# Patient Record
Sex: FEMALE | Race: WHITE | NOT HISPANIC OR LATINO
[De-identification: names, ages, dates, MRNs, and addresses within clinical notes are randomized per-mention and may not be internally consistent; named-entity substitution may affect disease eponyms.]

---

## 2017-05-19 PROBLEM — Z00.00 ENCOUNTER FOR PREVENTIVE HEALTH EXAMINATION: Status: ACTIVE | Noted: 2017-05-19

## 2017-05-31 ENCOUNTER — APPOINTMENT (OUTPATIENT)
Dept: NEUROLOGY | Facility: CLINIC | Age: 37
End: 2017-05-31

## 2017-11-22 ENCOUNTER — EMERGENCY (EMERGENCY)
Facility: HOSPITAL | Age: 37
LOS: 1 days | Discharge: HOME | End: 2017-11-22
Attending: EMERGENCY MEDICINE | Admitting: EMERGENCY MEDICINE
Payer: COMMERCIAL

## 2017-11-22 VITALS
SYSTOLIC BLOOD PRESSURE: 139 MMHG | TEMPERATURE: 98 F | HEART RATE: 90 BPM | DIASTOLIC BLOOD PRESSURE: 93 MMHG | OXYGEN SATURATION: 98 %

## 2017-11-22 LAB
ALBUMIN SERPL ELPH-MCNC: 4.4 G/DL — SIGNIFICANT CHANGE UP (ref 3.3–5)
ALP SERPL-CCNC: 64 U/L — SIGNIFICANT CHANGE UP (ref 40–120)
ALT FLD-CCNC: 15 U/L — SIGNIFICANT CHANGE UP (ref 4–33)
APPEARANCE UR: SIGNIFICANT CHANGE UP
AST SERPL-CCNC: 20 U/L — SIGNIFICANT CHANGE UP (ref 4–32)
BASOPHILS # BLD AUTO: 0.05 K/UL — SIGNIFICANT CHANGE UP (ref 0–0.2)
BASOPHILS NFR BLD AUTO: 0.5 % — SIGNIFICANT CHANGE UP (ref 0–2)
BILIRUB SERPL-MCNC: 0.5 MG/DL — SIGNIFICANT CHANGE UP (ref 0.2–1.2)
BILIRUB UR-MCNC: NEGATIVE — SIGNIFICANT CHANGE UP
BLOOD UR QL VISUAL: HIGH
BUN SERPL-MCNC: 10 MG/DL — SIGNIFICANT CHANGE UP (ref 7–23)
CALCIUM SERPL-MCNC: 9.2 MG/DL — SIGNIFICANT CHANGE UP (ref 8.4–10.5)
CHLORIDE SERPL-SCNC: 103 MMOL/L — SIGNIFICANT CHANGE UP (ref 98–107)
CO2 SERPL-SCNC: 24 MMOL/L — SIGNIFICANT CHANGE UP (ref 22–31)
COLOR SPEC: YELLOW — SIGNIFICANT CHANGE UP
CREAT SERPL-MCNC: 0.83 MG/DL — SIGNIFICANT CHANGE UP (ref 0.5–1.3)
EOSINOPHIL # BLD AUTO: 0.07 K/UL — SIGNIFICANT CHANGE UP (ref 0–0.5)
EOSINOPHIL NFR BLD AUTO: 0.7 % — SIGNIFICANT CHANGE UP (ref 0–6)
GLUCOSE SERPL-MCNC: 116 MG/DL — HIGH (ref 70–99)
GLUCOSE UR-MCNC: NEGATIVE — SIGNIFICANT CHANGE UP
HCT VFR BLD CALC: 42.8 % — SIGNIFICANT CHANGE UP (ref 34.5–45)
HGB BLD-MCNC: 14.6 G/DL — SIGNIFICANT CHANGE UP (ref 11.5–15.5)
IMM GRANULOCYTES # BLD AUTO: 0.03 # — SIGNIFICANT CHANGE UP
IMM GRANULOCYTES NFR BLD AUTO: 0.3 % — SIGNIFICANT CHANGE UP (ref 0–1.5)
KETONES UR-MCNC: NEGATIVE — SIGNIFICANT CHANGE UP
LEUKOCYTE ESTERASE UR-ACNC: NEGATIVE — SIGNIFICANT CHANGE UP
LIDOCAIN IGE QN: 55.1 U/L — SIGNIFICANT CHANGE UP (ref 7–60)
LYMPHOCYTES # BLD AUTO: 1.7 K/UL — SIGNIFICANT CHANGE UP (ref 1–3.3)
LYMPHOCYTES # BLD AUTO: 16.7 % — SIGNIFICANT CHANGE UP (ref 13–44)
MCHC RBC-ENTMCNC: 30.5 PG — SIGNIFICANT CHANGE UP (ref 27–34)
MCHC RBC-ENTMCNC: 34.1 % — SIGNIFICANT CHANGE UP (ref 32–36)
MCV RBC AUTO: 89.5 FL — SIGNIFICANT CHANGE UP (ref 80–100)
MONOCYTES # BLD AUTO: 0.66 K/UL — SIGNIFICANT CHANGE UP (ref 0–0.9)
MONOCYTES NFR BLD AUTO: 6.5 % — SIGNIFICANT CHANGE UP (ref 2–14)
MUCOUS THREADS # UR AUTO: SIGNIFICANT CHANGE UP
NEUTROPHILS # BLD AUTO: 7.65 K/UL — HIGH (ref 1.8–7.4)
NEUTROPHILS NFR BLD AUTO: 75.3 % — SIGNIFICANT CHANGE UP (ref 43–77)
NITRITE UR-MCNC: NEGATIVE — SIGNIFICANT CHANGE UP
NRBC # FLD: 0 — SIGNIFICANT CHANGE UP
PH UR: 7.5 — SIGNIFICANT CHANGE UP (ref 4.6–8)
PLATELET # BLD AUTO: 276 K/UL — SIGNIFICANT CHANGE UP (ref 150–400)
PMV BLD: 9.3 FL — SIGNIFICANT CHANGE UP (ref 7–13)
POTASSIUM SERPL-MCNC: 3.7 MMOL/L — SIGNIFICANT CHANGE UP (ref 3.5–5.3)
POTASSIUM SERPL-SCNC: 3.7 MMOL/L — SIGNIFICANT CHANGE UP (ref 3.5–5.3)
PROT SERPL-MCNC: 7.2 G/DL — SIGNIFICANT CHANGE UP (ref 6–8.3)
PROT UR-MCNC: 30 — HIGH
RBC # BLD: 4.78 M/UL — SIGNIFICANT CHANGE UP (ref 3.8–5.2)
RBC # FLD: 12.2 % — SIGNIFICANT CHANGE UP (ref 10.3–14.5)
SODIUM SERPL-SCNC: 142 MMOL/L — SIGNIFICANT CHANGE UP (ref 135–145)
SP GR SPEC: 1.02 — SIGNIFICANT CHANGE UP (ref 1–1.03)
SQUAMOUS # UR AUTO: SIGNIFICANT CHANGE UP
UROBILINOGEN FLD QL: NORMAL E.U. — SIGNIFICANT CHANGE UP (ref 0.1–0.2)
WBC # BLD: 10.16 K/UL — SIGNIFICANT CHANGE UP (ref 3.8–10.5)
WBC # FLD AUTO: 10.16 K/UL — SIGNIFICANT CHANGE UP (ref 3.8–10.5)
WBC UR QL: SIGNIFICANT CHANGE UP (ref 0–?)

## 2017-11-22 PROCEDURE — 99284 EMERGENCY DEPT VISIT MOD MDM: CPT

## 2017-11-22 RX ORDER — SODIUM CHLORIDE 9 MG/ML
1000 INJECTION INTRAMUSCULAR; INTRAVENOUS; SUBCUTANEOUS ONCE
Qty: 0 | Refills: 0 | Status: COMPLETED | OUTPATIENT
Start: 2017-11-22 | End: 2017-11-22

## 2017-11-22 RX ORDER — FAMOTIDINE 10 MG/ML
20 INJECTION INTRAVENOUS ONCE
Qty: 0 | Refills: 0 | Status: COMPLETED | OUTPATIENT
Start: 2017-11-22 | End: 2017-11-22

## 2017-11-22 RX ORDER — METOCLOPRAMIDE HCL 10 MG
10 TABLET ORAL ONCE
Qty: 0 | Refills: 0 | Status: COMPLETED | OUTPATIENT
Start: 2017-11-22 | End: 2017-11-22

## 2017-11-22 RX ADMIN — SODIUM CHLORIDE 3000 MILLILITER(S): 9 INJECTION INTRAMUSCULAR; INTRAVENOUS; SUBCUTANEOUS at 22:09

## 2017-11-22 RX ADMIN — Medication 10 MILLIGRAM(S): at 22:09

## 2017-11-22 RX ADMIN — FAMOTIDINE 20 MILLIGRAM(S): 10 INJECTION INTRAVENOUS at 22:09

## 2017-11-22 NOTE — ED PROVIDER NOTE - OBJECTIVE STATEMENT
38 y/o F with a PMHx of HTN, presents to the ED for vomiting s/p use of muscle relaxant and Vicodin last night. Pt is being treated for chronic neck pain and was prescribed a muscle relaxant and Vicodin yesterday which pt took at 7:00pm. Pt is scheduled for MRI. Pt has been vomiting since waking up this morning, unable to tolerate PO intake. Pt also c/o HA. Denies abd pain, or any other complaints.

## 2017-11-22 NOTE — ED ADULT TRIAGE NOTE - CHIEF COMPLAINT QUOTE
Pt received cortisone shot and muscle relaxer yesterday for neck pain and has been throwing up since 7pm yesterday. Denies abd pain.

## 2017-11-22 NOTE — ED PROVIDER NOTE - MEDICAL DECISION MAKING DETAILS
38 y/o F presents for vomiting. Probable dehydration. Will give IV fluids and symptomatic treatment. Reassess.
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
## 2017-11-22 NOTE — ED ADULT NURSE NOTE - OBJECTIVE STATEMENT
Received pt in room intake 4, pt A&Ox4, respirations even and unlabored b/l. Abdomen soft, nondistended, nontender. IVL 20g Angiocath placed on left AC. Labs sent. Urine sent. Medicated as per provider order. Will continue to monitor.

## 2017-11-22 NOTE — ED PROVIDER NOTE - PROGRESS NOTE DETAILS
Patient reevaluated and feeling better, denies any pain at the moment, states that she feels hungry and wants to eat at home. Labs within normal, no dehydration, will DC home.

## 2018-02-02 ENCOUNTER — TRANSCRIPTION ENCOUNTER (OUTPATIENT)
Age: 38
End: 2018-02-02

## 2018-02-02 ENCOUNTER — APPOINTMENT (OUTPATIENT)
Dept: SURGERY | Facility: CLINIC | Age: 38
End: 2018-02-02
Payer: COMMERCIAL

## 2018-02-02 VITALS — DIASTOLIC BLOOD PRESSURE: 72 MMHG | HEART RATE: 96 BPM | HEIGHT: 65 IN | SYSTOLIC BLOOD PRESSURE: 111 MMHG

## 2018-02-02 DIAGNOSIS — F41.9 ANXIETY DISORDER, UNSPECIFIED: ICD-10-CM

## 2018-02-02 PROCEDURE — 99244 OFF/OP CNSLTJ NEW/EST MOD 40: CPT

## 2018-02-05 ENCOUNTER — RESULT REVIEW (OUTPATIENT)
Age: 38
End: 2018-02-05

## 2018-02-05 ENCOUNTER — FORM ENCOUNTER (OUTPATIENT)
Age: 38
End: 2018-02-05

## 2018-02-05 PROBLEM — F41.9 ANXIETY: Status: RESOLVED | Noted: 2018-02-05 | Resolved: 2018-02-05

## 2018-02-06 ENCOUNTER — APPOINTMENT (OUTPATIENT)
Dept: MAMMOGRAPHY | Facility: IMAGING CENTER | Age: 38
End: 2018-02-06
Payer: COMMERCIAL

## 2018-02-06 ENCOUNTER — OUTPATIENT (OUTPATIENT)
Dept: OUTPATIENT SERVICES | Facility: HOSPITAL | Age: 38
LOS: 1 days | End: 2018-02-06
Payer: COMMERCIAL

## 2018-02-06 DIAGNOSIS — Z00.8 ENCOUNTER FOR OTHER GENERAL EXAMINATION: ICD-10-CM

## 2018-02-06 PROCEDURE — C1739: CPT

## 2018-02-06 PROCEDURE — 19281 PERQ DEVICE BREAST 1ST IMAG: CPT | Mod: LT

## 2018-02-06 PROCEDURE — 19281 PERQ DEVICE BREAST 1ST IMAG: CPT

## 2018-02-08 ENCOUNTER — FORM ENCOUNTER (OUTPATIENT)
Age: 38
End: 2018-02-08

## 2018-02-08 ENCOUNTER — OUTPATIENT (OUTPATIENT)
Dept: OUTPATIENT SERVICES | Facility: HOSPITAL | Age: 38
LOS: 1 days | End: 2018-02-08
Payer: COMMERCIAL

## 2018-02-08 VITALS
SYSTOLIC BLOOD PRESSURE: 106 MMHG | WEIGHT: 132.94 LBS | HEART RATE: 89 BPM | RESPIRATION RATE: 20 BRPM | DIASTOLIC BLOOD PRESSURE: 70 MMHG | HEIGHT: 65 IN | OXYGEN SATURATION: 97 % | TEMPERATURE: 97 F

## 2018-02-08 DIAGNOSIS — I10 ESSENTIAL (PRIMARY) HYPERTENSION: ICD-10-CM

## 2018-02-08 DIAGNOSIS — D36.9 BENIGN NEOPLASM, UNSPECIFIED SITE: ICD-10-CM

## 2018-02-08 DIAGNOSIS — G43.909 MIGRAINE, UNSPECIFIED, NOT INTRACTABLE, WITHOUT STATUS MIGRAINOSUS: ICD-10-CM

## 2018-02-08 DIAGNOSIS — T78.40XA ALLERGY, UNSPECIFIED, INITIAL ENCOUNTER: ICD-10-CM

## 2018-02-08 DIAGNOSIS — D24.2 BENIGN NEOPLASM OF LEFT BREAST: ICD-10-CM

## 2018-02-08 LAB
ALBUMIN SERPL ELPH-MCNC: 4.4 G/DL — SIGNIFICANT CHANGE UP (ref 3.3–5)
ALP SERPL-CCNC: 63 U/L — SIGNIFICANT CHANGE UP (ref 40–120)
ALT FLD-CCNC: 15 U/L — SIGNIFICANT CHANGE UP (ref 4–33)
AST SERPL-CCNC: 16 U/L — SIGNIFICANT CHANGE UP (ref 4–32)
BILIRUB SERPL-MCNC: 0.4 MG/DL — SIGNIFICANT CHANGE UP (ref 0.2–1.2)
BUN SERPL-MCNC: 8 MG/DL — SIGNIFICANT CHANGE UP (ref 7–23)
CALCIUM SERPL-MCNC: 9.3 MG/DL — SIGNIFICANT CHANGE UP (ref 8.4–10.5)
CHLORIDE SERPL-SCNC: 103 MMOL/L — SIGNIFICANT CHANGE UP (ref 98–107)
CO2 SERPL-SCNC: 26 MMOL/L — SIGNIFICANT CHANGE UP (ref 22–31)
CREAT SERPL-MCNC: 0.73 MG/DL — SIGNIFICANT CHANGE UP (ref 0.5–1.3)
GLUCOSE SERPL-MCNC: 71 MG/DL — SIGNIFICANT CHANGE UP (ref 70–99)
HCT VFR BLD CALC: 42 % — SIGNIFICANT CHANGE UP (ref 34.5–45)
HGB BLD-MCNC: 13.8 G/DL — SIGNIFICANT CHANGE UP (ref 11.5–15.5)
MCHC RBC-ENTMCNC: 29.4 PG — SIGNIFICANT CHANGE UP (ref 27–34)
MCHC RBC-ENTMCNC: 32.9 % — SIGNIFICANT CHANGE UP (ref 32–36)
MCV RBC AUTO: 89.4 FL — SIGNIFICANT CHANGE UP (ref 80–100)
NRBC # FLD: 0 — SIGNIFICANT CHANGE UP
PLATELET # BLD AUTO: 291 K/UL — SIGNIFICANT CHANGE UP (ref 150–400)
PMV BLD: 9.6 FL — SIGNIFICANT CHANGE UP (ref 7–13)
POTASSIUM SERPL-MCNC: 3.9 MMOL/L — SIGNIFICANT CHANGE UP (ref 3.5–5.3)
POTASSIUM SERPL-SCNC: 3.9 MMOL/L — SIGNIFICANT CHANGE UP (ref 3.5–5.3)
PROT SERPL-MCNC: 7.1 G/DL — SIGNIFICANT CHANGE UP (ref 6–8.3)
RBC # BLD: 4.7 M/UL — SIGNIFICANT CHANGE UP (ref 3.8–5.2)
RBC # FLD: 12.1 % — SIGNIFICANT CHANGE UP (ref 10.3–14.5)
SODIUM SERPL-SCNC: 141 MMOL/L — SIGNIFICANT CHANGE UP (ref 135–145)
WBC # BLD: 9.92 K/UL — SIGNIFICANT CHANGE UP (ref 3.8–10.5)
WBC # FLD AUTO: 9.92 K/UL — SIGNIFICANT CHANGE UP (ref 3.8–10.5)

## 2018-02-08 PROCEDURE — 93010 ELECTROCARDIOGRAM REPORT: CPT

## 2018-02-08 RX ORDER — PROPRANOLOL HCL 160 MG
1 CAPSULE, EXTENDED RELEASE 24HR ORAL
Qty: 0 | Refills: 0 | COMMUNITY

## 2018-02-08 NOTE — H&P PST ADULT - FAMILY HISTORY
Mother  Still living? No  Family history of acute myocardial infarction, Age at diagnosis: Age Unknown     Father  Still living? No  Family history of cancer, Age at diagnosis: Age Unknown

## 2018-02-08 NOTE — H&P PST ADULT - NS PRO LAST MENSTRUAL DATE
2/02/18/unknown 2/02/18; pt denies chance of pregnancy/unknown 2/02/18 ; surgeon denies chance of pregnancy

## 2018-02-08 NOTE — H&P PST ADULT - HISTORY OF PRESENT ILLNESS
Pt is a 37 y.o. female ; pt s/p mammogram 1/17 ; pt reports dense breasts; pt reports " nodule left breast" pt to surgeon ; pt now presents for Excision Left Breast Mass with Destiny      Pt denies breast pain , denies nipple discharge

## 2018-02-08 NOTE — H&P PST ADULT - PROBLEM SELECTOR PLAN 1
Excision Left Breast Mass with Destiny     Pre op instructions given to pt pt appears to have a good understanding of pre op instructions Excision Left Breast Mass with Destiny     Pre op instructions given to pt pt appears to have a good understanding of pre op instructions  Cup provided for UCG dos

## 2018-02-08 NOTE — H&P PST ADULT - NSANTHOSAYNRD_GEN_A_CORE
No. NASEEM screening performed.  STOP BANG Legend: 0-2 = LOW Risk; 3-4 = INTERMEDIATE Risk; 5-8 = HIGH Risk

## 2018-02-08 NOTE — H&P PST ADULT - BREASTS COMMENTS
unable to palpate breast mass ; surgeon with original studies ;left breast s/p insertion cal ; @ 3 o'clock proximal to nipple [ highlighted by marker] since insertion  2/06/18 surgeon with original studies ;left breast s/p insertion cal ; @ 3 o'clock proximal to nipple [ highlighted by marker] since insertion  2/06/18

## 2018-02-08 NOTE — H&P PST ADULT - LYMPHATIC
supraclavicular L/posterior cervical L/posterior cervical R/supraclavicular R/anterior cervical R/anterior cervical L
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
## 2018-02-09 ENCOUNTER — APPOINTMENT (OUTPATIENT)
Dept: SURGERY | Facility: HOSPITAL | Age: 38
End: 2018-02-09

## 2018-02-09 ENCOUNTER — RESULT REVIEW (OUTPATIENT)
Age: 38
End: 2018-02-09

## 2018-02-09 ENCOUNTER — OUTPATIENT (OUTPATIENT)
Dept: OUTPATIENT SERVICES | Facility: HOSPITAL | Age: 38
LOS: 1 days | Discharge: HOME | End: 2018-02-09
Payer: COMMERCIAL

## 2018-02-09 VITALS
WEIGHT: 132.94 LBS | HEIGHT: 65 IN | TEMPERATURE: 98 F | HEART RATE: 88 BPM | SYSTOLIC BLOOD PRESSURE: 109 MMHG | RESPIRATION RATE: 14 BRPM | DIASTOLIC BLOOD PRESSURE: 68 MMHG

## 2018-02-09 VITALS
SYSTOLIC BLOOD PRESSURE: 111 MMHG | TEMPERATURE: 98 F | OXYGEN SATURATION: 95 % | DIASTOLIC BLOOD PRESSURE: 62 MMHG | RESPIRATION RATE: 12 BRPM | HEART RATE: 75 BPM

## 2018-02-09 DIAGNOSIS — D24.2 BENIGN NEOPLASM OF LEFT BREAST: ICD-10-CM

## 2018-02-09 PROCEDURE — 19125 EXCISION BREAST LESION: CPT

## 2018-02-09 PROCEDURE — 76098 X-RAY EXAM SURGICAL SPECIMEN: CPT | Mod: 26,GC

## 2018-02-09 PROCEDURE — 88305 TISSUE EXAM BY PATHOLOGIST: CPT | Mod: 26

## 2018-02-09 RX ORDER — SODIUM CHLORIDE 9 MG/ML
1000 INJECTION, SOLUTION INTRAVENOUS
Qty: 0 | Refills: 0 | Status: DISCONTINUED | OUTPATIENT
Start: 2018-02-09 | End: 2018-02-09

## 2018-02-09 RX ORDER — OXYCODONE HYDROCHLORIDE 5 MG/1
5 TABLET ORAL EVERY 4 HOURS
Qty: 0 | Refills: 0 | Status: DISCONTINUED | OUTPATIENT
Start: 2018-02-09 | End: 2018-02-09

## 2018-02-09 RX ORDER — ONDANSETRON 8 MG/1
4 TABLET, FILM COATED ORAL ONCE
Qty: 0 | Refills: 0 | Status: DISCONTINUED | OUTPATIENT
Start: 2018-02-09 | End: 2018-02-24

## 2018-02-09 RX ORDER — FENTANYL CITRATE 50 UG/ML
50 INJECTION INTRAVENOUS
Qty: 0 | Refills: 0 | Status: DISCONTINUED | OUTPATIENT
Start: 2018-02-09 | End: 2018-02-09

## 2018-02-09 RX ORDER — FENTANYL CITRATE 50 UG/ML
25 INJECTION INTRAVENOUS
Qty: 0 | Refills: 0 | Status: DISCONTINUED | OUTPATIENT
Start: 2018-02-09 | End: 2018-02-09

## 2018-02-09 RX ORDER — SODIUM CHLORIDE 9 MG/ML
1000 INJECTION, SOLUTION INTRAVENOUS
Qty: 0 | Refills: 0 | Status: DISCONTINUED | OUTPATIENT
Start: 2018-02-09 | End: 2018-02-24

## 2018-02-09 RX ORDER — ACETAMINOPHEN 500 MG
650 TABLET ORAL EVERY 6 HOURS
Qty: 0 | Refills: 0 | Status: DISCONTINUED | OUTPATIENT
Start: 2018-02-09 | End: 2018-02-24

## 2018-02-09 RX ORDER — ACETAMINOPHEN 500 MG
2 TABLET ORAL
Qty: 0 | Refills: 0 | COMMUNITY
Start: 2018-02-09

## 2018-02-09 NOTE — ASU DISCHARGE PLAN (ADULT/PEDIATRIC). - MEDICATION SUMMARY - MEDICATIONS TO TAKE
I will START or STAY ON the medications listed below when I get home from the hospital:    acetaminophen 325 mg oral tablet  -- 2 tab(s) by mouth every 6 hours, As needed, Moderate Pain (4 - 6)  -- Indication: For pain    Inderal 40 mg oral tablet  -- 1 tab(s) by mouth prn  -- Indication: For home    ALPRAZolam 2 mg oral tablet  -- 1 tab(s) by mouth once a day, As Needed for anxiety  -- Indication: For home

## 2018-02-09 NOTE — ASU DISCHARGE PLAN (ADULT/PEDIATRIC). - MEDICATION SUMMARY - MEDICATIONS TO STOP TAKING
I will STOP taking the medications listed below when I get home from the hospital:    Excedrin oral tablet  -- 2 tab(s) by mouth every 6 hours, As Needed for headache

## 2018-02-09 NOTE — ASU DISCHARGE PLAN (ADULT/PEDIATRIC). - INSTRUCTIONS
2/26/18 Start with clear liquids and gradually increase your diet as you can, until you return to your normal diet. Do not eat too much too fast for today. Start with small meals, and what you can tolerate. Avoid eating anything greasy or spicy for today to avoid nausea. You may not have an appetite today, and that is normal due to anesthesia but drink plenty of fluids throughout the day.

## 2018-02-09 NOTE — ASU DISCHARGE PLAN (ADULT/PEDIATRIC). - DRESSING FT
wear supportive bra 24 hr/jacksno for 2 weeks.  remove guaze prior to shower wear supportive bra 24 hr/day for 2 weeks.  remove guaze prior to shower

## 2018-02-09 NOTE — ASU DISCHARGE PLAN (ADULT/PEDIATRIC). - NOTIFY
Fever greater than 101/Bleeding that does not stop Pain not relieved by Medications/Signs of infection: redness around surgical site, hot and tender to the touch, pus drainage of any kind accompanied by foul odor/Fever greater than 101/Bleeding that does not stop/Unable to Urinate

## 2018-02-09 NOTE — ASU DISCHARGE PLAN (ADULT/PEDIATRIC). - NURSING INSTRUCTIONS
You were given IV Tylenol for pain management in the Operating Room.  Please NOT take tylenol/acetaminophen products for the next 6-8 hours (after  950PM).  Refer to medication reconcillation sheet attached with discharge instruction paperwork.

## 2018-02-10 ENCOUNTER — TRANSCRIPTION ENCOUNTER (OUTPATIENT)
Age: 38
End: 2018-02-10

## 2018-02-13 ENCOUNTER — TRANSCRIPTION ENCOUNTER (OUTPATIENT)
Age: 38
End: 2018-02-13

## 2018-02-13 LAB — SURGICAL PATHOLOGY STUDY: SIGNIFICANT CHANGE UP

## 2018-02-24 ENCOUNTER — TRANSCRIPTION ENCOUNTER (OUTPATIENT)
Age: 38
End: 2018-02-24

## 2018-02-26 ENCOUNTER — APPOINTMENT (OUTPATIENT)
Dept: SURGERY | Facility: CLINIC | Age: 38
End: 2018-02-26
Payer: COMMERCIAL

## 2018-02-26 PROCEDURE — 99024 POSTOP FOLLOW-UP VISIT: CPT

## 2018-03-15 ENCOUNTER — TRANSCRIPTION ENCOUNTER (OUTPATIENT)
Age: 38
End: 2018-03-15

## 2018-06-18 ENCOUNTER — APPOINTMENT (OUTPATIENT)
Dept: SURGERY | Facility: CLINIC | Age: 38
End: 2018-06-18
Payer: COMMERCIAL

## 2018-06-18 DIAGNOSIS — D24.2 BENIGN NEOPLASM OF LEFT BREAST: ICD-10-CM

## 2018-06-18 PROCEDURE — 99214 OFFICE O/P EST MOD 30 MIN: CPT

## 2018-11-08 PROBLEM — F32.9 MAJOR DEPRESSIVE DISORDER, SINGLE EPISODE, UNSPECIFIED: Chronic | Status: ACTIVE | Noted: 2018-02-08

## 2018-11-08 PROBLEM — F41.9 ANXIETY DISORDER, UNSPECIFIED: Chronic | Status: ACTIVE | Noted: 2018-02-08

## 2018-11-15 ENCOUNTER — APPOINTMENT (OUTPATIENT)
Dept: SPINE | Facility: CLINIC | Age: 38
End: 2018-11-15
Payer: COMMERCIAL

## 2018-11-15 VITALS
HEIGHT: 65 IN | WEIGHT: 135 LBS | DIASTOLIC BLOOD PRESSURE: 70 MMHG | SYSTOLIC BLOOD PRESSURE: 118 MMHG | BODY MASS INDEX: 22.49 KG/M2

## 2018-11-15 PROCEDURE — 99204 OFFICE O/P NEW MOD 45 MIN: CPT

## 2018-11-20 ENCOUNTER — APPOINTMENT (OUTPATIENT)
Dept: CT IMAGING | Facility: CLINIC | Age: 38
End: 2018-11-20
Payer: COMMERCIAL

## 2018-11-20 ENCOUNTER — OUTPATIENT (OUTPATIENT)
Dept: OUTPATIENT SERVICES | Facility: HOSPITAL | Age: 38
LOS: 1 days | End: 2018-11-20
Payer: COMMERCIAL

## 2018-11-20 VITALS
HEIGHT: 65 IN | TEMPERATURE: 98 F | HEART RATE: 97 BPM | RESPIRATION RATE: 20 BRPM | SYSTOLIC BLOOD PRESSURE: 143 MMHG | DIASTOLIC BLOOD PRESSURE: 89 MMHG | OXYGEN SATURATION: 96 % | WEIGHT: 145.95 LBS

## 2018-11-20 DIAGNOSIS — G95.9 DISEASE OF SPINAL CORD, UNSPECIFIED: ICD-10-CM

## 2018-11-20 DIAGNOSIS — Z29.9 ENCOUNTER FOR PROPHYLACTIC MEASURES, UNSPECIFIED: ICD-10-CM

## 2018-11-20 DIAGNOSIS — M54.12 RADICULOPATHY, CERVICAL REGION: ICD-10-CM

## 2018-11-20 DIAGNOSIS — Z98.890 OTHER SPECIFIED POSTPROCEDURAL STATES: Chronic | ICD-10-CM

## 2018-11-20 DIAGNOSIS — Z01.818 ENCOUNTER FOR OTHER PREPROCEDURAL EXAMINATION: ICD-10-CM

## 2018-11-20 LAB
BLD GP AB SCN SERPL QL: NEGATIVE — SIGNIFICANT CHANGE UP
HCT VFR BLD CALC: 44.2 % — SIGNIFICANT CHANGE UP (ref 34.5–45)
HGB BLD-MCNC: 14.4 G/DL — SIGNIFICANT CHANGE UP (ref 11.5–15.5)
MCHC RBC-ENTMCNC: 30.5 PG — SIGNIFICANT CHANGE UP (ref 27–34)
MCHC RBC-ENTMCNC: 32.6 GM/DL — SIGNIFICANT CHANGE UP (ref 32–36)
MCV RBC AUTO: 93.6 FL — SIGNIFICANT CHANGE UP (ref 80–100)
MRSA PCR RESULT.: SIGNIFICANT CHANGE UP
PLATELET # BLD AUTO: 285 K/UL — SIGNIFICANT CHANGE UP (ref 150–400)
RBC # BLD: 4.72 M/UL — SIGNIFICANT CHANGE UP (ref 3.8–5.2)
RBC # FLD: 13.5 % — SIGNIFICANT CHANGE UP (ref 10.3–14.5)
RH IG SCN BLD-IMP: POSITIVE — SIGNIFICANT CHANGE UP
S AUREUS DNA NOSE QL NAA+PROBE: SIGNIFICANT CHANGE UP
WBC # BLD: 9.85 K/UL — SIGNIFICANT CHANGE UP (ref 3.8–10.5)
WBC # FLD AUTO: 9.85 K/UL — SIGNIFICANT CHANGE UP (ref 3.8–10.5)

## 2018-11-20 PROCEDURE — 86850 RBC ANTIBODY SCREEN: CPT

## 2018-11-20 PROCEDURE — 72125 CT NECK SPINE W/O DYE: CPT | Mod: 26

## 2018-11-20 PROCEDURE — 86900 BLOOD TYPING SEROLOGIC ABO: CPT

## 2018-11-20 PROCEDURE — G0463: CPT

## 2018-11-20 PROCEDURE — 87640 STAPH A DNA AMP PROBE: CPT

## 2018-11-20 PROCEDURE — 87641 MR-STAPH DNA AMP PROBE: CPT

## 2018-11-20 PROCEDURE — 85027 COMPLETE CBC AUTOMATED: CPT

## 2018-11-20 PROCEDURE — 86901 BLOOD TYPING SEROLOGIC RH(D): CPT

## 2018-11-20 PROCEDURE — 72125 CT NECK SPINE W/O DYE: CPT

## 2018-11-20 RX ORDER — PROPRANOLOL HCL 160 MG
1 CAPSULE, EXTENDED RELEASE 24HR ORAL
Qty: 0 | Refills: 0 | COMMUNITY

## 2018-11-20 RX ORDER — CEFAZOLIN SODIUM 1 G
2000 VIAL (EA) INJECTION ONCE
Qty: 0 | Refills: 0 | Status: DISCONTINUED | OUTPATIENT
Start: 2018-11-27 | End: 2018-11-28

## 2018-11-20 NOTE — H&P PST ADULT - ATTENDING COMMENTS
After reviewing MRI and recently done CT scan, there is clearly significant foraminal stenosis on the right at C5-6 in addition to C6-7 and therefore she is recommended to have C5-6 and C6-7 ACDF.  This has been fully explained to the patient.

## 2018-11-20 NOTE — H&P PST ADULT - PROBLEM SELECTOR PLAN 2
The Caprini score indicates that this patient is low risk for a VTE event (score 0-2).    VTE prophylaxis should focus on early ambulation.    Intermittent compression devices (IPC) may be of benefit to some patients.
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
## 2018-11-20 NOTE — H&P PST ADULT - ASSESSMENT

## 2018-11-20 NOTE — H&P PST ADULT - HISTORY OF PRESENT ILLNESS
39 yo female with neck pain for 1 yr, radiating down right hand. S/p 3 cortisone injections with no relief.

## 2018-11-26 ENCOUNTER — TRANSCRIPTION ENCOUNTER (OUTPATIENT)
Age: 38
End: 2018-11-26

## 2018-11-27 ENCOUNTER — APPOINTMENT (OUTPATIENT)
Dept: SPINE | Facility: HOSPITAL | Age: 38
End: 2018-11-27

## 2018-11-27 ENCOUNTER — INPATIENT (INPATIENT)
Facility: HOSPITAL | Age: 38
LOS: 0 days | Discharge: HOME | DRG: 473 | End: 2018-11-28
Attending: NEUROLOGICAL SURGERY | Admitting: NEUROLOGICAL SURGERY
Payer: COMMERCIAL

## 2018-11-27 VITALS
HEIGHT: 65 IN | SYSTOLIC BLOOD PRESSURE: 122 MMHG | DIASTOLIC BLOOD PRESSURE: 86 MMHG | HEART RATE: 88 BPM | WEIGHT: 145.95 LBS | TEMPERATURE: 98 F | OXYGEN SATURATION: 99 % | RESPIRATION RATE: 18 BRPM

## 2018-11-27 DIAGNOSIS — Z98.890 OTHER SPECIFIED POSTPROCEDURAL STATES: Chronic | ICD-10-CM

## 2018-11-27 DIAGNOSIS — G95.9 DISEASE OF SPINAL CORD, UNSPECIFIED: ICD-10-CM

## 2018-11-27 LAB
ALBUMIN SERPL ELPH-MCNC: 4.2 G/DL — SIGNIFICANT CHANGE UP (ref 3.3–5)
ALP SERPL-CCNC: 61 U/L — SIGNIFICANT CHANGE UP (ref 40–120)
ALT FLD-CCNC: 12 U/L — SIGNIFICANT CHANGE UP (ref 10–45)
ANION GAP SERPL CALC-SCNC: 15 MMOL/L — SIGNIFICANT CHANGE UP (ref 5–17)
AST SERPL-CCNC: 17 U/L — SIGNIFICANT CHANGE UP (ref 10–40)
BILIRUB SERPL-MCNC: 0.2 MG/DL — SIGNIFICANT CHANGE UP (ref 0.2–1.2)
BUN SERPL-MCNC: 8 MG/DL — SIGNIFICANT CHANGE UP (ref 7–23)
CALCIUM SERPL-MCNC: 8.7 MG/DL — SIGNIFICANT CHANGE UP (ref 8.4–10.5)
CHLORIDE SERPL-SCNC: 104 MMOL/L — SIGNIFICANT CHANGE UP (ref 96–108)
CO2 SERPL-SCNC: 22 MMOL/L — SIGNIFICANT CHANGE UP (ref 22–31)
CREAT SERPL-MCNC: 0.85 MG/DL — SIGNIFICANT CHANGE UP (ref 0.5–1.3)
GLUCOSE SERPL-MCNC: 112 MG/DL — HIGH (ref 70–99)
HCG UR QL: NEGATIVE — SIGNIFICANT CHANGE UP
HCT VFR BLD CALC: 43.1 % — SIGNIFICANT CHANGE UP (ref 34.5–45)
HGB BLD-MCNC: 14.2 G/DL — SIGNIFICANT CHANGE UP (ref 11.5–15.5)
MCHC RBC-ENTMCNC: 30.1 PG — SIGNIFICANT CHANGE UP (ref 27–34)
MCHC RBC-ENTMCNC: 32.9 GM/DL — SIGNIFICANT CHANGE UP (ref 32–36)
MCV RBC AUTO: 91.3 FL — SIGNIFICANT CHANGE UP (ref 80–100)
PLATELET # BLD AUTO: 263 K/UL — SIGNIFICANT CHANGE UP (ref 150–400)
POTASSIUM SERPL-MCNC: 4 MMOL/L — SIGNIFICANT CHANGE UP (ref 3.5–5.3)
POTASSIUM SERPL-SCNC: 4 MMOL/L — SIGNIFICANT CHANGE UP (ref 3.5–5.3)
PROT SERPL-MCNC: 6.6 G/DL — SIGNIFICANT CHANGE UP (ref 6–8.3)
RBC # BLD: 4.72 M/UL — SIGNIFICANT CHANGE UP (ref 3.8–5.2)
RBC # FLD: 12.4 % — SIGNIFICANT CHANGE UP (ref 10.3–14.5)
RH IG SCN BLD-IMP: POSITIVE — SIGNIFICANT CHANGE UP
SODIUM SERPL-SCNC: 141 MMOL/L — SIGNIFICANT CHANGE UP (ref 135–145)
WBC # BLD: 8.2 K/UL — SIGNIFICANT CHANGE UP (ref 3.8–10.5)
WBC # FLD AUTO: 8.2 K/UL — SIGNIFICANT CHANGE UP (ref 3.8–10.5)

## 2018-11-27 PROCEDURE — 22551 ARTHRD ANT NTRBDY CERVICAL: CPT | Mod: GC

## 2018-11-27 PROCEDURE — 22552 ARTHRD ANT NTRBD CERVICAL EA: CPT | Mod: GC

## 2018-11-27 PROCEDURE — 22853 INSJ BIOMECHANICAL DEVICE: CPT | Mod: GC

## 2018-11-27 RX ORDER — OXYCODONE HYDROCHLORIDE 5 MG/1
5 TABLET ORAL EVERY 4 HOURS
Qty: 0 | Refills: 0 | Status: DISCONTINUED | OUTPATIENT
Start: 2018-11-27 | End: 2018-11-28

## 2018-11-27 RX ORDER — ALPRAZOLAM 0.25 MG
2 TABLET ORAL DAILY
Qty: 0 | Refills: 0 | Status: DISCONTINUED | OUTPATIENT
Start: 2018-11-27 | End: 2018-11-28

## 2018-11-27 RX ORDER — OXYCODONE HYDROCHLORIDE 5 MG/1
10 TABLET ORAL EVERY 4 HOURS
Qty: 0 | Refills: 0 | Status: DISCONTINUED | OUTPATIENT
Start: 2018-11-27 | End: 2018-11-28

## 2018-11-27 RX ORDER — SENNA PLUS 8.6 MG/1
2 TABLET ORAL AT BEDTIME
Qty: 0 | Refills: 0 | Status: DISCONTINUED | OUTPATIENT
Start: 2018-11-27 | End: 2018-11-28

## 2018-11-27 RX ORDER — ONDANSETRON 8 MG/1
4 TABLET, FILM COATED ORAL ONCE
Qty: 0 | Refills: 0 | Status: DISCONTINUED | OUTPATIENT
Start: 2018-11-27 | End: 2018-11-27

## 2018-11-27 RX ORDER — ONDANSETRON 8 MG/1
4 TABLET, FILM COATED ORAL EVERY 6 HOURS
Qty: 0 | Refills: 0 | Status: DISCONTINUED | OUTPATIENT
Start: 2018-11-27 | End: 2018-11-28

## 2018-11-27 RX ORDER — DOCUSATE SODIUM 100 MG
100 CAPSULE ORAL THREE TIMES A DAY
Qty: 0 | Refills: 0 | Status: DISCONTINUED | OUTPATIENT
Start: 2018-11-27 | End: 2018-11-28

## 2018-11-27 RX ORDER — PANTOPRAZOLE SODIUM 20 MG/1
40 TABLET, DELAYED RELEASE ORAL
Qty: 0 | Refills: 0 | Status: DISCONTINUED | OUTPATIENT
Start: 2018-11-27 | End: 2018-11-28

## 2018-11-27 RX ORDER — SODIUM CHLORIDE 9 MG/ML
3 INJECTION INTRAMUSCULAR; INTRAVENOUS; SUBCUTANEOUS EVERY 8 HOURS
Qty: 0 | Refills: 0 | Status: DISCONTINUED | OUTPATIENT
Start: 2018-11-27 | End: 2018-11-27

## 2018-11-27 RX ORDER — CEFAZOLIN SODIUM 1 G
1000 VIAL (EA) INJECTION EVERY 8 HOURS
Qty: 0 | Refills: 0 | Status: DISCONTINUED | OUTPATIENT
Start: 2018-11-27 | End: 2018-11-28

## 2018-11-27 RX ORDER — HYDROMORPHONE HYDROCHLORIDE 2 MG/ML
0.5 INJECTION INTRAMUSCULAR; INTRAVENOUS; SUBCUTANEOUS
Qty: 0 | Refills: 0 | Status: DISCONTINUED | OUTPATIENT
Start: 2018-11-27 | End: 2018-11-27

## 2018-11-27 RX ORDER — LIDOCAINE HCL 20 MG/ML
0.2 VIAL (ML) INJECTION ONCE
Qty: 0 | Refills: 0 | Status: DISCONTINUED | OUTPATIENT
Start: 2018-11-27 | End: 2018-11-27

## 2018-11-27 RX ORDER — CLONAZEPAM 1 MG
2 TABLET ORAL DAILY
Qty: 0 | Refills: 0 | Status: DISCONTINUED | OUTPATIENT
Start: 2018-11-27 | End: 2018-11-28

## 2018-11-27 RX ORDER — DEXTROSE MONOHYDRATE, SODIUM CHLORIDE, AND POTASSIUM CHLORIDE 50; .745; 4.5 G/1000ML; G/1000ML; G/1000ML
1000 INJECTION, SOLUTION INTRAVENOUS
Qty: 0 | Refills: 0 | Status: DISCONTINUED | OUTPATIENT
Start: 2018-11-27 | End: 2018-11-28

## 2018-11-27 RX ORDER — ACETAMINOPHEN 500 MG
650 TABLET ORAL EVERY 6 HOURS
Qty: 0 | Refills: 0 | Status: DISCONTINUED | OUTPATIENT
Start: 2018-11-27 | End: 2018-11-28

## 2018-11-27 RX ADMIN — Medication 2 MILLIGRAM(S): at 22:14

## 2018-11-27 RX ADMIN — Medication 100 MILLIGRAM(S): at 21:02

## 2018-11-27 RX ADMIN — Medication 100 MILLIGRAM(S): at 21:06

## 2018-11-27 RX ADMIN — OXYCODONE HYDROCHLORIDE 10 MILLIGRAM(S): 5 TABLET ORAL at 23:06

## 2018-11-27 RX ADMIN — HYDROMORPHONE HYDROCHLORIDE 0.5 MILLIGRAM(S): 2 INJECTION INTRAMUSCULAR; INTRAVENOUS; SUBCUTANEOUS at 16:53

## 2018-11-27 RX ADMIN — DEXTROSE MONOHYDRATE, SODIUM CHLORIDE, AND POTASSIUM CHLORIDE 75 MILLILITER(S): 50; .745; 4.5 INJECTION, SOLUTION INTRAVENOUS at 16:53

## 2018-11-27 RX ADMIN — HYDROMORPHONE HYDROCHLORIDE 0.5 MILLIGRAM(S): 2 INJECTION INTRAMUSCULAR; INTRAVENOUS; SUBCUTANEOUS at 16:15

## 2018-11-27 NOTE — PATIENT PROFILE ADULT - NSPROMEDSPUMP_GEN_A_NUR
none
64 yo m with suspected right leg cellulitis s/p recent cabg/ aortic valve repair on coumadin. Pt admitted for r/o Cellulitis RLE. Started on IV clindamycin. LE duplex: negative for DVT. Consulted by ID     Mitral valve disorder  - daily INR on daily Coumadin     HTN/afib  - Cardizem 300 mg/lasix 40 mg po qd/losartan 50 mg po qd    -RAZA from july 27th with normal LVF, ef 60%, mild lvh.  Pt is optimized for discharge with outpt follow up with PCP and his cardiologist

## 2018-11-27 NOTE — PROGRESS NOTE ADULT - ASSESSMENT
38F pod#0 ACDF  - Patient may go to floor when post op more than 6 hours, stable, and pain well controlled

## 2018-11-27 NOTE — BRIEF OPERATIVE NOTE - PROCEDURE
<<-----Click on this checkbox to enter Procedure Anterior cervical discectomy  11/27/2018    Active  [id number]

## 2018-11-27 NOTE — PHYSICAL THERAPY INITIAL EVALUATION ADULT - PERTINENT HX OF CURRENT PROBLEM, REHAB EVAL
Pt is  a 37 y/o female admitted to [facility] on 11/27/18 with neck pain for 1 yr, radiating down right hand. S/p 3 cortisone injections with no relief. Pt is now s/p C5-7 ACDF

## 2018-11-27 NOTE — PROGRESS NOTE ADULT - SUBJECTIVE AND OBJECTIVE BOX
Patient seen and examined at bedside.    --Anticoagulation--    T(C): 36.4 (11-27-18 @ 15:20), Max: 36.5 (11-27-18 @ 11:52)  HR: 79 (11-27-18 @ 18:00) (72 - 88)  BP: 128/72 (11-27-18 @ 18:00) (114/92 - 138/83)  RR: 17 (11-27-18 @ 18:00) (14 - 18)  SpO2: 94% (11-27-18 @ 18:00) (94% - 99%)  Wt(kg): --    Exam:  AOx3, FC, PERRL, EOMI, no facial   5/5 throughout except RUE 4+/5 bicep/tricep (stable preop)  SILT  no clonus

## 2018-11-28 ENCOUNTER — INBOUND DOCUMENT (OUTPATIENT)
Age: 38
End: 2018-11-28

## 2018-11-28 ENCOUNTER — TRANSCRIPTION ENCOUNTER (OUTPATIENT)
Age: 38
End: 2018-11-28

## 2018-11-28 VITALS
DIASTOLIC BLOOD PRESSURE: 69 MMHG | TEMPERATURE: 98 F | RESPIRATION RATE: 18 BRPM | OXYGEN SATURATION: 94 % | SYSTOLIC BLOOD PRESSURE: 109 MMHG | HEART RATE: 90 BPM

## 2018-11-28 LAB
ANION GAP SERPL CALC-SCNC: 11 MMOL/L — SIGNIFICANT CHANGE UP (ref 5–17)
BUN SERPL-MCNC: 7 MG/DL — SIGNIFICANT CHANGE UP (ref 7–23)
CALCIUM SERPL-MCNC: 9 MG/DL — SIGNIFICANT CHANGE UP (ref 8.4–10.5)
CHLORIDE SERPL-SCNC: 105 MMOL/L — SIGNIFICANT CHANGE UP (ref 96–108)
CO2 SERPL-SCNC: 24 MMOL/L — SIGNIFICANT CHANGE UP (ref 22–31)
CREAT SERPL-MCNC: 0.69 MG/DL — SIGNIFICANT CHANGE UP (ref 0.5–1.3)
GLUCOSE SERPL-MCNC: 119 MG/DL — HIGH (ref 70–99)
HCT VFR BLD CALC: 38.8 % — SIGNIFICANT CHANGE UP (ref 34.5–45)
HGB BLD-MCNC: 13 G/DL — SIGNIFICANT CHANGE UP (ref 11.5–15.5)
MCHC RBC-ENTMCNC: 30.3 PG — SIGNIFICANT CHANGE UP (ref 27–34)
MCHC RBC-ENTMCNC: 33.5 GM/DL — SIGNIFICANT CHANGE UP (ref 32–36)
MCV RBC AUTO: 90.4 FL — SIGNIFICANT CHANGE UP (ref 80–100)
PLATELET # BLD AUTO: 265 K/UL — SIGNIFICANT CHANGE UP (ref 150–400)
POTASSIUM SERPL-MCNC: 3.8 MMOL/L — SIGNIFICANT CHANGE UP (ref 3.5–5.3)
POTASSIUM SERPL-SCNC: 3.8 MMOL/L — SIGNIFICANT CHANGE UP (ref 3.5–5.3)
RBC # BLD: 4.29 M/UL — SIGNIFICANT CHANGE UP (ref 3.8–5.2)
RBC # FLD: 13.4 % — SIGNIFICANT CHANGE UP (ref 10.3–14.5)
SODIUM SERPL-SCNC: 140 MMOL/L — SIGNIFICANT CHANGE UP (ref 135–145)
WBC # BLD: 12.02 K/UL — HIGH (ref 3.8–10.5)
WBC # FLD AUTO: 12.02 K/UL — HIGH (ref 3.8–10.5)

## 2018-11-28 PROCEDURE — 80053 COMPREHEN METABOLIC PANEL: CPT

## 2018-11-28 PROCEDURE — 86901 BLOOD TYPING SEROLOGIC RH(D): CPT

## 2018-11-28 PROCEDURE — 97161 PT EVAL LOW COMPLEX 20 MIN: CPT

## 2018-11-28 PROCEDURE — C1889: CPT

## 2018-11-28 PROCEDURE — 86900 BLOOD TYPING SEROLOGIC ABO: CPT

## 2018-11-28 PROCEDURE — 76000 FLUOROSCOPY <1 HR PHYS/QHP: CPT

## 2018-11-28 PROCEDURE — 85027 COMPLETE CBC AUTOMATED: CPT

## 2018-11-28 PROCEDURE — 81025 URINE PREGNANCY TEST: CPT

## 2018-11-28 PROCEDURE — 80048 BASIC METABOLIC PNL TOTAL CA: CPT

## 2018-11-28 RX ORDER — NICOTINE POLACRILEX 2 MG
1 GUM BUCCAL DAILY
Qty: 0 | Refills: 0 | Status: DISCONTINUED | OUTPATIENT
Start: 2018-11-28 | End: 2018-11-28

## 2018-11-28 RX ORDER — HYDROMORPHONE HYDROCHLORIDE 2 MG/ML
1 INJECTION INTRAMUSCULAR; INTRAVENOUS; SUBCUTANEOUS
Qty: 20 | Refills: 0 | OUTPATIENT
Start: 2018-11-28

## 2018-11-28 RX ORDER — HYDROMORPHONE HYDROCHLORIDE 2 MG/ML
2 INJECTION INTRAMUSCULAR; INTRAVENOUS; SUBCUTANEOUS EVERY 4 HOURS
Qty: 0 | Refills: 0 | Status: DISCONTINUED | OUTPATIENT
Start: 2018-11-28 | End: 2018-11-28

## 2018-11-28 RX ORDER — CYCLOBENZAPRINE HYDROCHLORIDE 10 MG/1
1 TABLET, FILM COATED ORAL
Qty: 20 | Refills: 0 | OUTPATIENT
Start: 2018-11-28

## 2018-11-28 RX ORDER — CYCLOBENZAPRINE HYDROCHLORIDE 10 MG/1
5 TABLET, FILM COATED ORAL THREE TIMES A DAY
Qty: 0 | Refills: 0 | Status: DISCONTINUED | OUTPATIENT
Start: 2018-11-28 | End: 2018-11-28

## 2018-11-28 RX ORDER — ACETAMINOPHEN 500 MG
2 TABLET ORAL
Qty: 0 | Refills: 0 | COMMUNITY
Start: 2018-11-28

## 2018-11-28 RX ADMIN — OXYCODONE HYDROCHLORIDE 10 MILLIGRAM(S): 5 TABLET ORAL at 03:25

## 2018-11-28 RX ADMIN — OXYCODONE HYDROCHLORIDE 10 MILLIGRAM(S): 5 TABLET ORAL at 00:00

## 2018-11-28 RX ADMIN — OXYCODONE HYDROCHLORIDE 10 MILLIGRAM(S): 5 TABLET ORAL at 04:00

## 2018-11-28 RX ADMIN — Medication 100 MILLIGRAM(S): at 05:10

## 2018-11-28 RX ADMIN — PANTOPRAZOLE SODIUM 40 MILLIGRAM(S): 20 TABLET, DELAYED RELEASE ORAL at 05:10

## 2018-11-28 RX ADMIN — HYDROMORPHONE HYDROCHLORIDE 2 MILLIGRAM(S): 2 INJECTION INTRAMUSCULAR; INTRAVENOUS; SUBCUTANEOUS at 09:06

## 2018-11-28 RX ADMIN — HYDROMORPHONE HYDROCHLORIDE 2 MILLIGRAM(S): 2 INJECTION INTRAMUSCULAR; INTRAVENOUS; SUBCUTANEOUS at 09:36

## 2018-11-28 NOTE — DISCHARGE NOTE ADULT - MEDICATION SUMMARY - MEDICATIONS TO TAKE
I will START or STAY ON the medications listed below when I get home from the hospital:    HYDROmorphone 2 mg oral tablet  -- 1 tab(s) by mouth every 4 to 6 hours, As Needed -Moderate Pain -  severe pain MDD:4   -- Indication: For moderate - severe pain     acetaminophen 500 mg oral tablet  -- 2 tab(s) by mouth every 6 hours, As Needed - for moderate pain  -- Indication: For mild to moderate pain     clonazePAM 2 mg oral tablet  -- 1 tab(s) by mouth once a day  -- Indication: For anxiety (Home med)    ALPRAZolam 2 mg oral tablet  -- 1 tab(s) by mouth once a day, As Needed for anxiety  -- Indication: For anxiety (Home med)    Dulcolax Laxative 5 mg oral delayed release tablet  -- 1 tab(s) by mouth once a day, As Needed - for constipation  -- Indication: For constipation    cyclobenzaprine 5 mg oral tablet  -- 1 tab(s) by mouth 3 times a day, As needed, Muscle Spasm  -- Indication: For muscle tightness

## 2018-11-28 NOTE — DISCHARGE NOTE ADULT - HOSPITAL COURSE
39 yo female with neck pain for 1 yr, radiating down right hand. S/p 3 cortisone injections with no relief. s/p C5_-C7 ACDF 11/27/18. Post op course uneventful. Evaluated by PT and discharged home in stable condition 37 yo female with neck pain for 1 yr, radiating down right hand. S/p 3 cortisone injections with no relief. s/p C5_-C7 ACDF 11/27/18. Post op course uneventful. labs within acceptable levels . Evaluated by PT and discharged home in stable condition

## 2018-11-28 NOTE — DISCHARGE NOTE ADULT - CARE PLAN
Principal Discharge DX:	Cervical stenosis of spinal canal  Goal:	s/p C5_-C7 ACDF 11/27/18.  Assessment and plan of treatment:	Incision evaluation at Dr Mansfield office in 1 week. remove occlusive dressing after shower  Keep Incision Clean and Dry. May get incision wet  11/2/18. Quick shower. pat dry after. no creams or lotions on incision. No immersion baths..  Do not take Aspirin  Motrin, aleve , Naproxen for pain.  No strenous activity. No heavy lifting. Do not return to work until cleared by physician. No driving until cleared by physician.  Secondary Diagnosis:	Anxiety  Assessment and plan of treatment:	Continue anti anxiety meds as prescribed Principal Discharge DX:	Cervical stenosis of spinal canal  Goal:	s/p C5_-C7 ACDF 11/27/18.  Assessment and plan of treatment:	Incision evaluation at Dr Mansfield office in 1 week. remove occlusive dressing after shower. Steristrips to fall off by itself.   Keep Incision Clean and Dry. May get incision wet  11/2/18. Quick shower. pat dry after. no creams or lotions on incision. No immersion baths..  Do not take Aspirin  Motrin, aleve , Naproxen for pain.  No strenous activity. No heavy lifting. Do not return to work until cleared by physician. No driving until cleared by physician.  Secondary Diagnosis:	Anxiety  Assessment and plan of treatment:	Continue anti anxiety meds as prescribed

## 2018-11-28 NOTE — DISCHARGE NOTE ADULT - PATIENT PORTAL LINK FT
You can access the [facility] Patient Portal, offered by [facility], by registering with the following website: [email or web address]

## 2018-11-28 NOTE — PROVIDER CONTACT NOTE (OTHER) - ASSESSMENT
PT VSS, A&Ox4, ant neck dsg CDI. Pt neuro check WDL, c/o RUE numbness from shoulder to elbow. Pt states numbness has stayed constant post op

## 2018-11-28 NOTE — DISCHARGE NOTE ADULT - ADDITIONAL INSTRUCTIONS
Return to ER if develops difficulty swallowing, fevers, bleeding , wound drainage, uncontrolled pain, weakness of extremities, lethargy or sluggishness..

## 2018-11-28 NOTE — PROGRESS NOTE ADULT - ASSESSMENT
37 yo female with neck pain for 1 yr, radiating down right hand. S/p 3 cortisone injections with no relief. s/p C5_-C7 ACDF     Plan    Neuro stable   Anxiety -on clonazepam   Smoker-no intention to quit  Vitals stable   PT-Outpatient  D/c home today

## 2018-11-28 NOTE — PROGRESS NOTE ADULT - SUBJECTIVE AND OBJECTIVE BOX
SUBJECTIVE:   Tolerating breakfast . pain between shoulder blades and some soreness of throat   OVERNIGHT EVENTS: none    Vital Signs Last 24 Hrs  T(C): 36.7 (28 Nov 2018 04:35), Max: 37 (27 Nov 2018 21:00)  T(F): 98.1 (28 Nov 2018 04:35), Max: 98.6 (27 Nov 2018 21:00)  HR: 86 (28 Nov 2018 04:35) (72 - 95)  BP: 105/69 (28 Nov 2018 04:35) (99/66 - 138/83)  BP(mean): 98 (27 Nov 2018 20:00) (92 - 114)  RR: 16 (28 Nov 2018 04:35) (14 - 18)  SpO2: 95% (28 Nov 2018 04:35) (94% - 99%)    PHYSICAL EXAM:    Constitutional: No Acute Distress     Neurological: AOx3, Following Commands, Moving all Extremities 5/5. No dysphonia     Pulmonary: Clear to Auscultation,     Cardiovascular: S1, S2, Regular rate and rhythm     Gastrointestinal: Soft, Non-tender, Non-distended     Extremities: No calf tenderness     LABS:                        13.0   12.02 )-----------( 265      ( 28 Nov 2018 08:13 )             38.8    11-28    140  |  105  |  7   ----------------------------<  119<H>  3.8   |  24  |  0.69    Ca    9.0      28 Nov 2018 06:37    TPro  6.6  /  Alb  4.2  /  TBili  0.2  /  DBili  x   /  AST  17  /  ALT  12  /  AlkPhos  61  11-27        IMAGING:         MEDICATIONS:    acetaminophen   Tablet .. 650 milliGRAM(s) Oral every 6 hours PRN Temp greater or equal to 38C (100.4F), Mild Pain (1 - 3)  ALPRAZolam 2 milliGRAM(s) Oral daily PRN anxiety  clonazePAM Tablet 2 milliGRAM(s) Oral daily  cyclobenzaprine 5 milliGRAM(s) Oral three times a day PRN Muscle Spasm  HYDROmorphone   Tablet 2 milliGRAM(s) Oral every 4 hours PRN Moderate Pain (4 - 6)  ondansetron Injectable 4 milliGRAM(s) IV Push every 6 hours PRN Nausea and/or Vomiting  docusate sodium 100 milliGRAM(s) Oral three times a day  pantoprazole    Tablet 40 milliGRAM(s) Oral before breakfast  senna 2 Tablet(s) Oral at bedtime PRN Constipation  nicotine -   7 mG/24Hr(s) Patch 1 patch Transdermal daily  sodium chloride 0.9% with potassium chloride 20 mEq/L 1000 milliLiter(s) IV Continuous <Continuous>      DIET: SUBJECTIVE:   Tolerating breakfast . pain between shoulder blades and some soreness of throat   OVERNIGHT EVENTS: none    Vital Signs Last 24 Hrs  T(C): 36.7 (28 Nov 2018 04:35), Max: 37 (27 Nov 2018 21:00)  T(F): 98.1 (28 Nov 2018 04:35), Max: 98.6 (27 Nov 2018 21:00)  HR: 86 (28 Nov 2018 04:35) (72 - 95)  BP: 105/69 (28 Nov 2018 04:35) (99/66 - 138/83)  BP(mean): 98 (27 Nov 2018 20:00) (92 - 114)  RR: 16 (28 Nov 2018 04:35) (14 - 18)  SpO2: 95% (28 Nov 2018 04:35) (94% - 99%)    PHYSICAL EXAM:    Constitutional: No Acute Distress     Neurological: AOx3, Following Commands, Moving all Extremities 5/5. No dysphonia . Anterior neck incision steristrips C/D/I    Pulmonary: Clear to Auscultation,     Cardiovascular: S1, S2, Regular rate and rhythm     Gastrointestinal: Soft, Non-tender, Non-distended     Extremities: No calf tenderness     LABS:                        13.0   12.02 )-----------( 265      ( 28 Nov 2018 08:13 )             38.8    11-28    140  |  105  |  7   ----------------------------<  119<H>  3.8   |  24  |  0.69    Ca    9.0      28 Nov 2018 06:37    TPro  6.6  /  Alb  4.2  /  TBili  0.2  /  DBili  x   /  AST  17  /  ALT  12  /  AlkPhos  61  11-27        IMAGING:         MEDICATIONS:    acetaminophen   Tablet .. 650 milliGRAM(s) Oral every 6 hours PRN Temp greater or equal to 38C (100.4F), Mild Pain (1 - 3)  ALPRAZolam 2 milliGRAM(s) Oral daily PRN anxiety  clonazePAM Tablet 2 milliGRAM(s) Oral daily  cyclobenzaprine 5 milliGRAM(s) Oral three times a day PRN Muscle Spasm  HYDROmorphone   Tablet 2 milliGRAM(s) Oral every 4 hours PRN Moderate Pain (4 - 6)  ondansetron Injectable 4 milliGRAM(s) IV Push every 6 hours PRN Nausea and/or Vomiting  docusate sodium 100 milliGRAM(s) Oral three times a day  pantoprazole    Tablet 40 milliGRAM(s) Oral before breakfast  senna 2 Tablet(s) Oral at bedtime PRN Constipation  nicotine -   7 mG/24Hr(s) Patch 1 patch Transdermal daily  sodium chloride 0.9% with potassium chloride 20 mEq/L 1000 milliLiter(s) IV Continuous <Continuous>      DIET:

## 2018-11-28 NOTE — DISCHARGE NOTE ADULT - CARE PROVIDER_API CALL
Ej Mansfield (MD), Neurological Surgery  [address] [place] [address] [address]  [place] [address]  Phone: [phone]  Fax: [phone]

## 2018-11-28 NOTE — DISCHARGE NOTE ADULT - PLAN OF CARE
s/p C5_-C7 ACDF 11/27/18. Incision evaluation at Dr Mansfield office in 1 week. remove occlusive dressing after shower  Keep Incision Clean and Dry. May get incision wet  11/2/18. Quick shower. pat dry after. no creams or lotions on incision. No immersion baths..  Do not take Aspirin  Motrin, aleve , Naproxen for pain.  No strenous activity. No heavy lifting. Do not return to work until cleared by physician. No driving until cleared by physician. Continue anti anxiety meds as prescribed Incision evaluation at Dr Mansfield office in 1 week. remove occlusive dressing after shower. Steristrips to fall off by itself.   Keep Incision Clean and Dry. May get incision wet  11/2/18. Quick shower. pat dry after. no creams or lotions on incision. No immersion baths..  Do not take Aspirin  Motrin, aleve , Naproxen for pain.  No strenous activity. No heavy lifting. Do not return to work until cleared by physician. No driving until cleared by physician.

## 2018-11-28 NOTE — DISCHARGE NOTE ADULT - NS AS ACTIVITY OBS
No Heavy lifting/straining/Stairs allowed/Do not drive or operate machinery/Showering allowed/Walking-Outdoors allowed/Walking-Indoors allowed

## 2018-11-29 ENCOUNTER — MEDICATION RENEWAL (OUTPATIENT)
Age: 38
End: 2018-11-29

## 2018-12-06 ENCOUNTER — APPOINTMENT (OUTPATIENT)
Dept: SPINE | Facility: CLINIC | Age: 38
End: 2018-12-06
Payer: COMMERCIAL

## 2018-12-06 ENCOUNTER — OTHER (OUTPATIENT)
Age: 38
End: 2018-12-06

## 2018-12-06 VITALS
HEIGHT: 65 IN | DIASTOLIC BLOOD PRESSURE: 80 MMHG | SYSTOLIC BLOOD PRESSURE: 140 MMHG | WEIGHT: 135 LBS | BODY MASS INDEX: 22.49 KG/M2 | TEMPERATURE: 98.1 F

## 2018-12-06 PROCEDURE — 99024 POSTOP FOLLOW-UP VISIT: CPT

## 2018-12-13 ENCOUNTER — APPOINTMENT (OUTPATIENT)
Dept: SPINE | Facility: CLINIC | Age: 38
End: 2018-12-13
Payer: COMMERCIAL

## 2018-12-13 VITALS
HEIGHT: 65 IN | DIASTOLIC BLOOD PRESSURE: 84 MMHG | BODY MASS INDEX: 22.49 KG/M2 | WEIGHT: 135 LBS | SYSTOLIC BLOOD PRESSURE: 126 MMHG

## 2018-12-13 PROCEDURE — 99024 POSTOP FOLLOW-UP VISIT: CPT

## 2018-12-13 RX ORDER — METHYLPREDNISOLONE 4 MG/1
4 TABLET ORAL
Qty: 1 | Refills: 0 | Status: DISCONTINUED | COMMUNITY
Start: 2018-12-06 | End: 2018-12-13

## 2019-01-16 ENCOUNTER — TRANSCRIPTION ENCOUNTER (OUTPATIENT)
Age: 39
End: 2019-01-16

## 2019-01-17 ENCOUNTER — APPOINTMENT (OUTPATIENT)
Dept: SPINE | Facility: CLINIC | Age: 39
End: 2019-01-17
Payer: COMMERCIAL

## 2019-01-17 VITALS
SYSTOLIC BLOOD PRESSURE: 134 MMHG | HEIGHT: 65 IN | WEIGHT: 142 LBS | BODY MASS INDEX: 23.66 KG/M2 | DIASTOLIC BLOOD PRESSURE: 82 MMHG

## 2019-01-17 PROCEDURE — 99024 POSTOP FOLLOW-UP VISIT: CPT

## 2019-01-17 RX ORDER — METHOCARBAMOL 500 MG/1
500 TABLET, FILM COATED ORAL EVERY 8 HOURS
Qty: 21 | Refills: 0 | Status: DISCONTINUED | COMMUNITY
Start: 2018-11-29 | End: 2019-01-17

## 2019-01-17 RX ORDER — DIAZEPAM 5 MG/1
5 TABLET ORAL EVERY 8 HOURS
Qty: 30 | Refills: 0 | Status: DISCONTINUED | COMMUNITY
Start: 2018-12-06 | End: 2019-01-17

## 2019-01-17 RX ORDER — HYDROMORPHONE HYDROCHLORIDE 4 MG/1
4 TABLET ORAL EVERY 4 HOURS
Qty: 42 | Refills: 0 | Status: DISCONTINUED | COMMUNITY
Start: 2018-11-29 | End: 2019-01-17

## 2019-01-17 NOTE — REASON FOR VISIT
[Follow-Up: _____] : a [unfilled] follow-up visit [Family Member] : family member [FreeTextEntry1] : Quite happy now 6 weeks status post 2 level ACDF. All her radicular symptoms are gone. She has some intermittent trapezius spasm. Recent x-rays show no change in hardware or alignment.

## 2019-01-17 NOTE — PHYSICAL EXAM
[Healing Well] : healing well [Erythema] : not erythematous [Tender] : not tender [Motor Strength] : muscle strength was normal in all four extremities [Sensation Tactile Decrease] : light touch was intact [Abnormal Walk] : normal gait

## 2019-02-28 ENCOUNTER — TRANSCRIPTION ENCOUNTER (OUTPATIENT)
Age: 39
End: 2019-02-28

## 2019-02-28 ENCOUNTER — APPOINTMENT (OUTPATIENT)
Dept: SPINE | Facility: CLINIC | Age: 39
End: 2019-02-28
Payer: COMMERCIAL

## 2019-02-28 VITALS
HEIGHT: 65 IN | DIASTOLIC BLOOD PRESSURE: 80 MMHG | BODY MASS INDEX: 23.66 KG/M2 | SYSTOLIC BLOOD PRESSURE: 140 MMHG | WEIGHT: 142 LBS

## 2019-02-28 PROCEDURE — 99213 OFFICE O/P EST LOW 20 MIN: CPT

## 2019-02-28 RX ORDER — PANTOPRAZOLE 40 MG/1
40 TABLET, DELAYED RELEASE ORAL
Refills: 0 | Status: DISCONTINUED | COMMUNITY
End: 2019-02-28

## 2019-02-28 RX ORDER — PROPRANOLOL HCL 40 MG
40 TABLET ORAL
Refills: 0 | Status: DISCONTINUED | COMMUNITY
End: 2019-02-28

## 2019-02-28 NOTE — ASSESSMENT
[FreeTextEntry1] : Doing well in terms of her cervical spine fusion. Followup in 4 months with new x-rays.
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
## 2019-03-11 ENCOUNTER — APPOINTMENT (OUTPATIENT)
Dept: PULMONOLOGY | Facility: CLINIC | Age: 39
End: 2019-03-11
Payer: COMMERCIAL

## 2019-03-11 ENCOUNTER — LABORATORY RESULT (OUTPATIENT)
Age: 39
End: 2019-03-11

## 2019-03-11 VITALS
BODY MASS INDEX: 23.32 KG/M2 | SYSTOLIC BLOOD PRESSURE: 120 MMHG | OXYGEN SATURATION: 99 % | DIASTOLIC BLOOD PRESSURE: 71 MMHG | WEIGHT: 140 LBS | RESPIRATION RATE: 14 BRPM | HEIGHT: 65 IN | HEART RATE: 84 BPM

## 2019-03-11 PROCEDURE — 99245 OFF/OP CONSLTJ NEW/EST HI 55: CPT | Mod: 25

## 2019-03-11 PROCEDURE — 36415 COLL VENOUS BLD VENIPUNCTURE: CPT

## 2019-03-11 PROCEDURE — 99406 BEHAV CHNG SMOKING 3-10 MIN: CPT

## 2019-03-11 PROCEDURE — 94727 GAS DIL/WSHOT DETER LNG VOL: CPT

## 2019-03-11 PROCEDURE — 94060 EVALUATION OF WHEEZING: CPT

## 2019-03-11 PROCEDURE — 94729 DIFFUSING CAPACITY: CPT

## 2019-03-12 LAB
A1AT SERPL-MCNC: 151 MG/DL
BASOPHILS # BLD AUTO: 0.08 K/UL
BASOPHILS NFR BLD AUTO: 0.8 %
EOSINOPHIL # BLD AUTO: 0.06 K/UL
EOSINOPHIL NFR BLD AUTO: 0.6 %
HCT VFR BLD CALC: 44.1 %
HGB BLD-MCNC: 13.8 G/DL
IMM GRANULOCYTES NFR BLD AUTO: 0.3 %
LYMPHOCYTES # BLD AUTO: 2.46 K/UL
LYMPHOCYTES NFR BLD AUTO: 25.1 %
MAN DIFF?: NORMAL
MCHC RBC-ENTMCNC: 29.2 PG
MCHC RBC-ENTMCNC: 31.3 GM/DL
MCV RBC AUTO: 93.2 FL
MONOCYTES # BLD AUTO: 0.58 K/UL
MONOCYTES NFR BLD AUTO: 5.9 %
MPO AB + PR3 PNL SER: NORMAL
NEUTROPHILS # BLD AUTO: 6.61 K/UL
NEUTROPHILS NFR BLD AUTO: 67.3 %
PLATELET # BLD AUTO: 292 K/UL
RBC # BLD: 4.73 M/UL
RBC # FLD: 12.9 %
WBC # FLD AUTO: 9.82 K/UL

## 2019-03-14 LAB — ACE BLD-CCNC: 25 U/L

## 2019-03-18 ENCOUNTER — FORM ENCOUNTER (OUTPATIENT)
Age: 39
End: 2019-03-18

## 2019-03-18 LAB
A ALTERNATA IGE QN: <0.1 KUA/L
A FUMIGATUS IGE QN: <0.1 KUA/L
C ALBICANS IGE QN: <0.1 KUA/L
C HERBARUM IGE QN: <0.1 KUA/L
CAT DANDER IGE QN: <0.1 KUA/L
CLAM IGE QN: <0.1 KUA/L
CODFISH IGE QN: <0.1 KUA/L
COMMON RAGWEED IGE QN: 9.57 KUA/L
CORN IGE QN: 0.17 KUA/L
COW MILK IGE QN: <0.1 KUA/L
D FARINAE IGE QN: <0.1 KUA/L
D PTERONYSS IGE QN: <0.1 KUA/L
DEPRECATED A ALTERNATA IGE RAST QL: 0
DEPRECATED A FUMIGATUS IGE RAST QL: 0
DEPRECATED C ALBICANS IGE RAST QL: 0
DEPRECATED C HERBARUM IGE RAST QL: 0
DEPRECATED CAT DANDER IGE RAST QL: 0
DEPRECATED CLAM IGE RAST QL: 0
DEPRECATED CODFISH IGE RAST QL: 0
DEPRECATED COMMON RAGWEED IGE RAST QL: 3
DEPRECATED CORN IGE RAST QL: NORMAL
DEPRECATED COW MILK IGE RAST QL: 0
DEPRECATED D FARINAE IGE RAST QL: 0
DEPRECATED D PTERONYSS IGE RAST QL: 0
DEPRECATED DOG DANDER IGE RAST QL: NORMAL
DEPRECATED EGG WHITE IGE RAST QL: NORMAL
DEPRECATED M RACEMOSUS IGE RAST QL: 0
DEPRECATED PEANUT IGE RAST QL: NORMAL
DEPRECATED ROACH IGE RAST QL: NORMAL
DEPRECATED SCALLOP IGE RAST QL: 0.12 KUA/L
DEPRECATED SESAME SEED IGE RAST QL: NORMAL
DEPRECATED SHRIMP IGE RAST QL: 0
DEPRECATED SOYBEAN IGE RAST QL: 0
DEPRECATED TIMOTHY IGE RAST QL: 2
DEPRECATED WALNUT IGE RAST QL: NORMAL
DEPRECATED WHEAT IGE RAST QL: NORMAL
DEPRECATED WHITE OAK IGE RAST QL: NORMAL
DOG DANDER IGE QN: 0.2 KUA/L
EGG WHITE IGE QN: 0.25 KUA/L
M RACEMOSUS IGE QN: <0.1 KUA/L
PEANUT IGE QN: 0.12 KUA/L
ROACH IGE QN: 0.14 KUA/L
SCALLOP IGE QN: <0.1 KUA/L
SCALLOP IGE QN: NORMAL
SESAME SEED IGE QN: 0.17 KUA/L
SOYBEAN IGE QN: <0.1 KUA/L
TIMOTHY IGE QN: 1.13 KUA/L
WALNUT IGE QN: 0.11 KUA/L
WHEAT IGE QN: 0.24 KUA/L
WHITE OAK IGE QN: 0.14 KUA/L

## 2019-03-19 ENCOUNTER — OUTPATIENT (OUTPATIENT)
Dept: OUTPATIENT SERVICES | Facility: HOSPITAL | Age: 39
LOS: 1 days | End: 2019-03-19
Payer: COMMERCIAL

## 2019-03-19 ENCOUNTER — APPOINTMENT (OUTPATIENT)
Dept: NUCLEAR MEDICINE | Facility: IMAGING CENTER | Age: 39
End: 2019-03-19
Payer: COMMERCIAL

## 2019-03-19 DIAGNOSIS — R91.1 SOLITARY PULMONARY NODULE: ICD-10-CM

## 2019-03-19 DIAGNOSIS — Z98.890 OTHER SPECIFIED POSTPROCEDURAL STATES: Chronic | ICD-10-CM

## 2019-03-19 PROCEDURE — 78815 PET IMAGE W/CT SKULL-THIGH: CPT

## 2019-03-19 PROCEDURE — 78815 PET IMAGE W/CT SKULL-THIGH: CPT | Mod: 26,PI

## 2019-03-19 PROCEDURE — A9552: CPT

## 2019-03-21 NOTE — HISTORY OF PRESENT ILLNESS
[Current] : is a current smoker [FreeTextEntry2] : 2 packs per day [FreeTextEntry1] : 39-year-old female\par  pulmonary evaluation for pulmonary nodule\par Underwent a breast MRI was noted to have an abnormality in the chest and subsequently had a formal CT scan of the chest\par CT chest and a 1.6 cm right middle lobe pulmonary nodule\par Unable to calculate fat content in the 1.6 cm pulmonary nodule\par Long-standing history of tobacco smoker approximately 54 year pack history of smoking up to 2 packs per day\par There is a history of bronchitis approximately one year ago\par States occasional wheeze with cough\par No history of pneumonia tuberculosis pulmonary embolic disease obstructive sleep apnea\par No reported sputum\par No hemoptysis\par No recent fevers chills or sweats\par No chest pain exertional chest pain\par Does complain of shortness of breath primarily with stair climbing inclines\par Lower extremity edema but does not has varicose veins\par No prior reported radiographic findings to the chest in the past\par Past medical history unremarkable\par No history of hypertension hypercholesterolemia diabetes kidney disease, and lactulose Crohn's, neurologic symptomatology\par History of bipolar disorder with anxiety depression

## 2019-03-21 NOTE — PHYSICAL EXAM
[General Appearance - Well Developed] : well developed [Normal Appearance] : normal appearance [Well Groomed] : well groomed [General Appearance - Well Nourished] : well nourished [No Deformities] : no deformities [General Appearance - In No Acute Distress] : no acute distress [Normal Conjunctiva] : the conjunctiva exhibited no abnormalities [Eyelids - No Xanthelasma] : the eyelids demonstrated no xanthelasmas [Normal Oropharynx] : normal oropharynx [I] : I [Neck Appearance] : the appearance of the neck was normal [Neck Cervical Mass (___cm)] : no neck mass was observed [Jugular Venous Distention Increased] : there was no jugular-venous distention [Thyroid Diffuse Enlargement] : the thyroid was not enlarged [Heart Rate And Rhythm] : heart rate and rhythm were normal [Heart Sounds] : normal S1 and S2 [Murmurs] : no murmurs present [Arterial Pulses Normal] : the arterial pulses were normal [Edema] : no peripheral edema present [Respiration, Rhythm And Depth] : normal respiratory rhythm and effort [Exaggerated Use Of Accessory Muscles For Inspiration] : no accessory muscle use [Auscultation Breath Sounds / Voice Sounds] : lungs were clear to auscultation bilaterally [Chest Palpation] : palpation of the chest revealed no abnormalities [Lungs Percussion] : the lungs were normal to percussion [Bowel Sounds] : normal bowel sounds [Abdomen Soft] : soft [Abdomen Tenderness] : non-tender [Abdomen Mass (___ Cm)] : no abdominal mass palpated [Abnormal Walk] : normal gait [Gait - Sufficient For Exercise Testing] : the gait was sufficient for exercise testing [Nail Clubbing] : no clubbing of the fingernails [Cyanosis, Localized] : no localized cyanosis [Petechial Hemorrhages (___cm)] : no petechial hemorrhages [Skin Color & Pigmentation] : normal skin color and pigmentation [Skin Turgor] : normal skin turgor [] : no rash [Deep Tendon Reflexes (DTR)] : deep tendon reflexes were 2+ and symmetric [Sensation] : the sensory exam was normal to light touch and pinprick [No Focal Deficits] : no focal deficits [Oriented To Time, Place, And Person] : oriented to person, place, and time [Impaired Insight] : insight and judgment were intact [Affect] : the affect was normal [Mood] : the mood was normal [FreeTextEntry1] : non icteric

## 2019-03-21 NOTE — CONSULT LETTER
[Dear  ___] : Dear  [unfilled], [Consult Letter:] : I had the pleasure of evaluating your patient, [unfilled]. [Please see my note below.] : Please see my note below. [Consult Closing:] : Thank you very much for allowing me to participate in the care of this patient.  If you have any questions, please do not hesitate to contact me. [Sincerely,] : Sincerely, [FreeTextEntry3] : Robert Grimes D.O., STEPHANIE\par   of Medicine\par [facility] [employer]\par

## 2019-03-21 NOTE — REVIEW OF SYSTEMS
[Depression] : depression [Anxiety] : anxiety [Negative] : Sleep Disorder [de-identified] : BiPolar Disorder

## 2019-03-21 NOTE — DISCUSSION/SUMMARY
[FreeTextEntry1] : Impression\par Pulmonary nodule right middle lobe 1.6 cm\par  significant tobacco smoker\par in a smoker cannot exclude a primary solitary pulmonary nodule lung cancer\par Rule out pulmonary hamartoma \par No calcium  content and cannot  confirm whether this would be a pulmonary  granuloma\par PET CT scan Scheduled 3/19/2019\par Request actual CT chest films\par Tobacco cessation\par Discussed tobacco cessation.  Options including nicotine supplements gums lozenges patches\par Addressed ramifications of Wellbutrin and Chantix.\par 10-minute intervention\par Possible surgical resection VATS with cardiothoracic surgery after above data is reviewed\par Blood work including CBC rest testing ACE level alpha one antitrypsin titer and ANCA\par ADDENDUM -elevated serum IgE level 3/17/19

## 2019-04-02 ENCOUNTER — APPOINTMENT (OUTPATIENT)
Dept: THORACIC SURGERY | Facility: CLINIC | Age: 39
End: 2019-04-02
Payer: COMMERCIAL

## 2019-04-02 VITALS
SYSTOLIC BLOOD PRESSURE: 110 MMHG | BODY MASS INDEX: 23.32 KG/M2 | TEMPERATURE: 98.3 F | HEART RATE: 68 BPM | WEIGHT: 140 LBS | RESPIRATION RATE: 16 BRPM | DIASTOLIC BLOOD PRESSURE: 75 MMHG | OXYGEN SATURATION: 98 % | HEIGHT: 65 IN

## 2019-04-02 DIAGNOSIS — R51 HEADACHE: ICD-10-CM

## 2019-04-02 PROCEDURE — 99205 OFFICE O/P NEW HI 60 MIN: CPT

## 2019-04-03 ENCOUNTER — APPOINTMENT (OUTPATIENT)
Dept: SURGICAL ONCOLOGY | Facility: CLINIC | Age: 39
End: 2019-04-03
Payer: COMMERCIAL

## 2019-04-03 VITALS
OXYGEN SATURATION: 97 % | HEIGHT: 65 IN | HEART RATE: 70 BPM | WEIGHT: 140 LBS | SYSTOLIC BLOOD PRESSURE: 108 MMHG | DIASTOLIC BLOOD PRESSURE: 72 MMHG | BODY MASS INDEX: 23.32 KG/M2 | RESPIRATION RATE: 17 BRPM | TEMPERATURE: 98.2 F

## 2019-04-03 DIAGNOSIS — J44.9 CHRONIC OBSTRUCTIVE PULMONARY DISEASE, UNSPECIFIED: ICD-10-CM

## 2019-04-03 DIAGNOSIS — G43.909 MIGRAINE, UNSPECIFIED, NOT INTRACTABLE, W/OUT STATUS MIGRAINOSUS: ICD-10-CM

## 2019-04-03 DIAGNOSIS — R23.4 CHANGES IN SKIN TEXTURE: ICD-10-CM

## 2019-04-03 DIAGNOSIS — Z86.59 PERSONAL HISTORY OF OTHER MENTAL AND BEHAVIORAL DISORDERS: ICD-10-CM

## 2019-04-03 PROCEDURE — 99245 OFF/OP CONSLTJ NEW/EST HI 55: CPT

## 2019-04-03 NOTE — CONSULT LETTER
[Dear  ___] : Dear  [unfilled], [Consult Letter:] : I had the pleasure of evaluating your patient, [unfilled]. [Please see my note below.] : Please see my note below. [Consult Closing:] : Thank you very much for allowing me to participate in the care of this patient.  If you have any questions, please do not hesitate to contact me. [Sincerely,] : Sincerely, [FreeTextEntry1] : She will follow-up with you for the VATS. [FreeTextEntry3] : Ti Chavez MD FACS\par Chief of Surgical Oncology\par \par

## 2019-04-03 NOTE — HISTORY OF PRESENT ILLNESS
[de-identified] : Ms. Cleary is a 39 year old female who presents today for an initial consultation.  She  was referred by Dr. Sergey Vasquez.\par \par Shelby 39-year-old female with 54 year pack history of smoking up to 2 packs per day who was noted to have a pulmonary nodule after undergoing a breast MRI.  She was subsequently sent for al CT scan of the chest which was notable for a 1.6 cm right middle lobe pulmonary nodule.  She then underwent a PETCT which was notable for a FDG avid Right middle lobe nodule suspicious for malignancy, FDG avid asymmetric Left perineal skin thickening and Right inguinal crease which were nonspecific.  She was also noted with a FDG avid focus posterior midline nasopharynx.  She is scheduled for a VATS procedure on April 15, 2019.  She denies any abdominal or perineal discomfort.   Denies unintentional weight loss.\par \par Internist: Jonas Huffman MD

## 2019-04-03 NOTE — PHYSICAL EXAM
[Normal] : supple, no neck mass and thyroid not enlarged [Normal Neck Lymph Nodes] : normal neck lymph nodes  [Normal Supraclavicular Lymph Nodes] : normal supraclavicular lymph nodes [Normal] : oriented to person, place and time, with appropriate affect [de-identified] : Ingrown hair noted in Right inguinal and left perineal area.

## 2019-04-03 NOTE — ASSESSMENT
[FreeTextEntry1] : Impression:\par Perineal skin thickening and FDG avid Right inguinal on PET CT most likely secondary to ingrown hairs\par \par \par Plan:\par VATS with Dr. Diaz as scheduled\par RTO PRN\par

## 2019-04-04 ENCOUNTER — OUTPATIENT (OUTPATIENT)
Dept: OUTPATIENT SERVICES | Facility: HOSPITAL | Age: 39
LOS: 1 days | End: 2019-04-04

## 2019-04-04 VITALS
HEART RATE: 76 BPM | TEMPERATURE: 98 F | RESPIRATION RATE: 16 BRPM | HEIGHT: 64 IN | WEIGHT: 139.99 LBS | DIASTOLIC BLOOD PRESSURE: 60 MMHG | SYSTOLIC BLOOD PRESSURE: 100 MMHG

## 2019-04-04 DIAGNOSIS — Z98.890 OTHER SPECIFIED POSTPROCEDURAL STATES: Chronic | ICD-10-CM

## 2019-04-04 DIAGNOSIS — R91.1 SOLITARY PULMONARY NODULE: ICD-10-CM

## 2019-04-04 LAB
ANION GAP SERPL CALC-SCNC: 15 MMO/L — HIGH (ref 7–14)
BLD GP AB SCN SERPL QL: NEGATIVE — SIGNIFICANT CHANGE UP
BUN SERPL-MCNC: 17 MG/DL — SIGNIFICANT CHANGE UP (ref 7–23)
CALCIUM SERPL-MCNC: 9.9 MG/DL — SIGNIFICANT CHANGE UP (ref 8.4–10.5)
CHLORIDE SERPL-SCNC: 103 MMOL/L — SIGNIFICANT CHANGE UP (ref 98–107)
CO2 SERPL-SCNC: 20 MMOL/L — LOW (ref 22–31)
CREAT SERPL-MCNC: 0.88 MG/DL — SIGNIFICANT CHANGE UP (ref 0.5–1.3)
GLUCOSE SERPL-MCNC: 94 MG/DL — SIGNIFICANT CHANGE UP (ref 70–99)
HCT VFR BLD CALC: 40.7 % — SIGNIFICANT CHANGE UP (ref 34.5–45)
HGB BLD-MCNC: 13.4 G/DL — SIGNIFICANT CHANGE UP (ref 11.5–15.5)
MCHC RBC-ENTMCNC: 29.1 PG — SIGNIFICANT CHANGE UP (ref 27–34)
MCHC RBC-ENTMCNC: 32.9 % — SIGNIFICANT CHANGE UP (ref 32–36)
MCV RBC AUTO: 88.3 FL — SIGNIFICANT CHANGE UP (ref 80–100)
NRBC # FLD: 0 K/UL — SIGNIFICANT CHANGE UP (ref 0–0)
PLATELET # BLD AUTO: 245 K/UL — SIGNIFICANT CHANGE UP (ref 150–400)
PMV BLD: 9.7 FL — SIGNIFICANT CHANGE UP (ref 7–13)
POTASSIUM SERPL-MCNC: 4.2 MMOL/L — SIGNIFICANT CHANGE UP (ref 3.5–5.3)
POTASSIUM SERPL-SCNC: 4.2 MMOL/L — SIGNIFICANT CHANGE UP (ref 3.5–5.3)
RBC # BLD: 4.61 M/UL — SIGNIFICANT CHANGE UP (ref 3.8–5.2)
RBC # FLD: 12.9 % — SIGNIFICANT CHANGE UP (ref 10.3–14.5)
RH IG SCN BLD-IMP: POSITIVE — SIGNIFICANT CHANGE UP
SODIUM SERPL-SCNC: 138 MMOL/L — SIGNIFICANT CHANGE UP (ref 135–145)
WBC # BLD: 7.97 K/UL — SIGNIFICANT CHANGE UP (ref 3.8–10.5)
WBC # FLD AUTO: 7.97 K/UL — SIGNIFICANT CHANGE UP (ref 3.8–10.5)

## 2019-04-04 RX ORDER — ALPRAZOLAM 0.25 MG
1 TABLET ORAL
Qty: 0 | Refills: 0 | COMMUNITY

## 2019-04-04 RX ORDER — CLONAZEPAM 1 MG
1 TABLET ORAL
Qty: 0 | Refills: 0 | COMMUNITY

## 2019-04-04 RX ORDER — SODIUM CHLORIDE 9 MG/ML
1000 INJECTION, SOLUTION INTRAVENOUS
Qty: 0 | Refills: 0 | Status: DISCONTINUED | OUTPATIENT
Start: 2019-04-15 | End: 2019-04-16
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
## 2019-04-04 NOTE — H&P PST ADULT - ASSESSMENT
38 y/o female presents to [facility] for scheduled flexible bronchoscopy, right video assisted thoracoscopy lung resection on 4/15/19. Patient with hx of right breast lump s/p lumpectomy 2/2018, routine follow with MRI 3/19 and incidental lung nodule noted.

## 2019-04-04 NOTE — H&P PST ADULT - NSICDXFAMILYHX_GEN_ALL_CORE_FT
FAMILY HISTORY:  Father  Still living? No  Family history of cancer, Age at diagnosis: Age Unknown    Mother  Still living? No  Family history of acute myocardial infarction, Age at diagnosis: Age Unknown

## 2019-04-04 NOTE — H&P PST ADULT - HISTORY OF PRESENT ILLNESS
40 y/o female presents to [facility] for scheduled flexible bronchoscopy, right video assisted thoracoscopy lung resection on 4/15/19. Patient with hx of left breast lump s/p lumpectomy 2/2018, routine follow with MRI 3/19 and incidental lung nodule noted.

## 2019-04-04 NOTE — H&P PST ADULT - NSICDXPASTSURGICALHX_GEN_ALL_CORE_FT
PAST SURGICAL HISTORY:  H/O neck surgery      (normal spontaneous vaginal delivery) x2    S/P breast biopsy, left

## 2019-04-04 NOTE — H&P PST ADULT - NSICDXPROBLEM_GEN_ALL_CORE_FT
PROBLEM DIAGNOSES  Problem: Lung nodule  Assessment and Plan:  Patient scheduled flexible bronchoscopy, right video assisted thoracoscopy lung resection on 4/15/19.  Pre op and Hibiclens instructions given and explained.  Avoid NSAIDs and OTC supplements.   Patient verbalized understanding  medical consult requested complete awaiting report

## 2019-04-04 NOTE — H&P PST ADULT - MUSCULOSKELETAL
details… detailed exam no joint swelling/normal strength/no joint warmth/no joint erythema/ROM intact

## 2019-04-05 ENCOUNTER — APPOINTMENT (OUTPATIENT)
Dept: OTOLARYNGOLOGY | Facility: CLINIC | Age: 39
End: 2019-04-05
Payer: COMMERCIAL

## 2019-04-05 VITALS
HEART RATE: 77 BPM | SYSTOLIC BLOOD PRESSURE: 100 MMHG | WEIGHT: 130 LBS | OXYGEN SATURATION: 98 % | DIASTOLIC BLOOD PRESSURE: 70 MMHG | HEIGHT: 65 IN | BODY MASS INDEX: 21.66 KG/M2

## 2019-04-05 DIAGNOSIS — J39.2 OTHER DISEASES OF PHARYNX: ICD-10-CM

## 2019-04-05 PROCEDURE — 99204 OFFICE O/P NEW MOD 45 MIN: CPT | Mod: 25

## 2019-04-05 PROCEDURE — 31231 NASAL ENDOSCOPY DX: CPT

## 2019-04-05 NOTE — PROCEDURE
[Mass] : a mass [Oxymetazoline HCl] : oxymetazoline HCl [Flexible Endoscope] : examined with the flexible endoscope [Serial Number: ___] : Serial Number: [unfilled] [FreeTextEntry6] : Left sided septal deviation and right sided septal spur is noted.  No lesions in the NC.  No evidence of sinonasal inflammatory disease, no purulence from the OMC or SE recesses.  Exam of the NPx with prominent midline soft tissue without evidence of ulceration and without obstruction of the ETOs bilaterally. [Topical Lidocaine] : topical lidocaine [de-identified] : No lesions in the OPx, HPx or larynx.  VC are mobile, airway patent.\par

## 2019-04-05 NOTE — PHYSICAL EXAM
[Nasal Endoscopy Performed] : nasal endoscopy was performed, see procedure section for findings [Midline] : trachea located in midline position [Laryngoscopy Performed] : laryngoscopy was performed, see procedure section for findings [Normal] : no rashes

## 2019-04-08 ENCOUNTER — FORM ENCOUNTER (OUTPATIENT)
Age: 39
End: 2019-04-08

## 2019-04-08 PROBLEM — R91.1 SOLITARY PULMONARY NODULE: Chronic | Status: ACTIVE | Noted: 2019-04-04

## 2019-04-08 NOTE — PHYSICAL EXAM
[Sclera] : the sclera and conjunctiva were normal [Outer Ear] : the ears and nose were normal in appearance [Hearing Threshold Finger Rub Not [place]] : hearing was normal [Neck Appearance] : the appearance of the neck was normal [] : the neck was supple [Neck Cervical Mass (___cm)] : no neck mass was observed [Respiration, Rhythm And Depth] : normal respiratory rhythm and effort [Auscultation Breath Sounds / Voice Sounds] : lungs were clear to auscultation bilaterally [Heart Rate And Rhythm] : heart rate was normal and rhythm regular [Heart Sounds] : normal S1 and S2 [Abdomen Soft] : soft [Abdomen Tenderness] : non-tender [Cervical Lymph Nodes Enlarged Posterior Bilaterally] : posterior cervical [Cervical Lymph Nodes Enlarged Anterior Bilaterally] : anterior cervical [Supraclavicular Lymph Nodes Enlarged Bilaterally] : supraclavicular [No CVA Tenderness] : no ~M costovertebral angle tenderness [Abnormal Walk] : normal gait [Skin Color & Pigmentation] : normal skin color and pigmentation [No Focal Deficits] : no focal deficits [Oriented To Time, Place, And Person] : oriented to person, place, and time [Impaired Insight] : insight and judgment were intact [Affect] : the affect was normal [FreeTextEntry1] : deferred

## 2019-04-08 NOTE — HISTORY OF PRESENT ILLNESS
[FreeTextEntry1] : 40 y/o female, current smoker (2 PPD x 27 years), with a lung nodule presents today for initial consultation.  She had a breast MRI done which revealed a lung nodule and she was referred to [facility] a pulmonologist.  Subsequent Chest CT Scan and PET/CT were performed. \par \par PMHx includes bipolar disorder, s/p lumpectomy (benign) and cervical spine surgery (anterior-cervical discectomy). \par \par CT Chest w/contrast on 2/24/19:\par - 1.6 x 1.2 cm RML nodule, this nodule contacts the mediastinum and there is thin strand extending from this nodule to the anterior pleural surface, no involvement of the right mainstem bronchus\par - small region of subcutaneous fat infiltration overlying Lt pectoralis major muscle 1.1 cm. \par \par PET/CT on 3/19/19:\par - FDG avid RML nodule 1.4 x 1.1 cm, SUV=5.6\par - FDG avid focus posterior midline nasopharynx, SUV=6\par - FDG avid Lt perineal skin thickening, SUV=6.3\par - 1 cm minimally FDG focus medial Lt proximal thigh SUV=1.5, appears to be a focal serpentine enlargement of a superficial vessel, probably a small varix\par \par 3/11/19 PFT's: FVC- 3.65 (106%), FEV1- 3.05 (106%), DLCO 20.32 (78%)\par She presents today with complaints of fatigue, chills, wheezing and SOB with exertion.  She denies any fever, cough, chest pain, hemoptysis, or recent illness.  She is currently still smoking, and has both Nicotine patches and nicotine gum at home, but has not started using them yet.

## 2019-04-08 NOTE — DATA REVIEWED
[FreeTextEntry1] : \par CT Chest w/contrast on 2/24/19:\par - 1.6 cm RML nodule, this nodule contacts the mediastinum and there is thin strand extending from this nodule to the anterior pleural surface\par - small region of subcutaneous fat infiltration overlying Lt pectoralis major muscle 1.1 cm. \par \par PET/CT on 3/19/19:\par - FDG avid RML 1.4 cm, SUV=5.6\par - FDG avid focus posterior midline nasopharynx, SUV=6\par - FDG avid Lt perineal skin thickening, SUV=6.3\par - 1 cm minimally FDG focus medial Lt proximal thingh SUV=1.5, appears to be a focal serpentine enlargement of a superficial vessel, probably a small varix

## 2019-04-08 NOTE — CONSULT LETTER
[Dear  ___] : Dear  [unfilled], [Consult Letter:] : I had the pleasure of evaluating your patient, [unfilled]. [Please see my note below.] : Please see my note below. [Sincerely,] : Sincerely, [FreeTextEntry2] : Dr. Robert Grimes (Pulm/Ref)\par Dr. Jonas Carmen (PCP) [FreeTextEntry3] : \par \par \par Santosh Vasquez MD, FACS \par Chief, Division of Thoracic Surgery \par Director, Minimally Invasive Thoracic Surgery \par Department of Cardiovascular and Thoracic Surgery \par [facility] \par , Cardiovascular and Thoracic Surgery

## 2019-04-08 NOTE — ASSESSMENT
[FreeTextEntry1] : 38 y/o, female, current smoker (2 PPD x 27 years), with a lung nodule presents today for initial consultation.  She had a breast MRI done which revealed a lung nodule and supsequent Chest CT Scan and PET/CT were done.   \par \par CT Chest w/contrast on 2/24/19:\par - 1.6 cm RML nodule, this nodule contacts the mediastinum and there is thin strand extending from this nodule to the anterior pleural surface\par - small region of subcutaneous fat infiltration overlying Lt pectoralis major muscle 1.1 cm. \par \par PET/CT on 3/19/19:\par - FDG avid RML 1.4 cm, SUV=5.6\par - FDG avid focus posterior midline nasopharynx, SUV=6\par - FDG avid Lt perineal skin thickening, SUV=6.3\par - 1 cm minimally FDG focus medial Lt proximal thigh SUV=1.5, appears to be a focal serpentine enlargement of a superficial vessel, probably a small varix\par \par I have reviewed the patient's medical records and diagnostic images during the time of this office visit, and I have made the following recommendation:\par 1.  Schedule for flexible bronchoscopy, right VATS, lung resection.  The risks, benefits, and alternatives to the procedure were discussed with the patient at length. She verbalized understanding, and are in agreement with the above treatment plan.\par 2.  Prior to surgery she will require medical clearance.\par 3.  Schedule for Brain MRI.\par 4.  Refer to [facility] for evaluation of PET avid nasopharynx findings.\par 5.  Refer to colorectal surgery for evaluation of PET avid perineal findings.\par 6.  Tobacco cessation strongly advised.\par \par Written by Germaine Peña NP, acting as a scribe for JULIO LI MD.\par \par The documentation recorded by the scribe accurately reflects the service I personally performed and the decisions made by me. JULIO LI MD\par

## 2019-04-08 NOTE — REVIEW OF SYSTEMS
[Chills] : chills [Feeling Tired] : feeling tired [Wheezing] : wheezing [SOB on Exertion] : shortness of breath during exertion [Joint Pain] : joint pain [Anxiety] : anxiety [Depression] : depression [Negative] : Heme/Lymph [Fever] : no fever [Feeling Poorly] : not feeling poorly [Recent Weight Gain (___ Lbs)] : no recent weight gain [Recent Weight Loss (___ Lbs)] : no recent weight loss [Suicidal] : not suicidal [FreeTextEntry9] : B/L knee pain

## 2019-04-09 ENCOUNTER — APPOINTMENT (OUTPATIENT)
Dept: CT IMAGING | Facility: CLINIC | Age: 39
End: 2019-04-09
Payer: COMMERCIAL

## 2019-04-09 ENCOUNTER — OUTPATIENT (OUTPATIENT)
Dept: OUTPATIENT SERVICES | Facility: HOSPITAL | Age: 39
LOS: 1 days | End: 2019-04-09
Payer: COMMERCIAL

## 2019-04-09 DIAGNOSIS — Z00.8 ENCOUNTER FOR OTHER GENERAL EXAMINATION: ICD-10-CM

## 2019-04-09 DIAGNOSIS — Z98.890 OTHER SPECIFIED POSTPROCEDURAL STATES: Chronic | ICD-10-CM

## 2019-04-09 PROCEDURE — 70491 CT SOFT TISSUE NECK W/DYE: CPT | Mod: 26

## 2019-04-09 PROCEDURE — 70491 CT SOFT TISSUE NECK W/DYE: CPT

## 2019-04-10 ENCOUNTER — APPOINTMENT (OUTPATIENT)
Dept: PULMONOLOGY | Facility: CLINIC | Age: 39
End: 2019-04-10
Payer: COMMERCIAL

## 2019-04-10 VITALS
HEART RATE: 67 BPM | BODY MASS INDEX: 23.9 KG/M2 | DIASTOLIC BLOOD PRESSURE: 73 MMHG | OXYGEN SATURATION: 99 % | WEIGHT: 140 LBS | RESPIRATION RATE: 16 BRPM | SYSTOLIC BLOOD PRESSURE: 106 MMHG | HEIGHT: 64 IN

## 2019-04-10 PROCEDURE — 99406 BEHAV CHNG SMOKING 3-10 MIN: CPT

## 2019-04-10 PROCEDURE — 94250: CPT

## 2019-04-10 PROCEDURE — 94060 EVALUATION OF WHEEZING: CPT

## 2019-04-10 PROCEDURE — 99214 OFFICE O/P EST MOD 30 MIN: CPT | Mod: 25

## 2019-04-10 NOTE — PHYSICAL EXAM
[General Appearance - Well Developed] : well developed [Normal Appearance] : normal appearance [Well Groomed] : well groomed [General Appearance - Well Nourished] : well nourished [No Deformities] : no deformities [General Appearance - In No Acute Distress] : no acute distress [Normal Conjunctiva] : the conjunctiva exhibited no abnormalities [Eyelids - No Xanthelasma] : the eyelids demonstrated no xanthelasmas [Normal Oropharynx] : normal oropharynx [I] : I [Neck Appearance] : the appearance of the neck was normal [Neck Cervical Mass (___cm)] : no neck mass was observed [Thyroid Diffuse Enlargement] : the thyroid was not enlarged [Jugular Venous Distention Increased] : there was no jugular-venous distention [Heart Rate And Rhythm] : heart rate and rhythm were normal [Heart Sounds] : normal S1 and S2 [Murmurs] : no murmurs present [Edema] : no peripheral edema present [Arterial Pulses Normal] : the arterial pulses were normal [Exaggerated Use Of Accessory Muscles For Inspiration] : no accessory muscle use [Respiration, Rhythm And Depth] : normal respiratory rhythm and effort [Auscultation Breath Sounds / Voice Sounds] : lungs were clear to auscultation bilaterally [Chest Palpation] : palpation of the chest revealed no abnormalities [Lungs Percussion] : the lungs were normal to percussion [Bowel Sounds] : normal bowel sounds [Abdomen Soft] : soft [Abdomen Tenderness] : non-tender [Abdomen Mass (___ Cm)] : no abdominal mass palpated [Abnormal Walk] : normal gait [Gait - Sufficient For Exercise Testing] : the gait was sufficient for exercise testing [Nail Clubbing] : no clubbing of the fingernails [Cyanosis, Localized] : no localized cyanosis [Petechial Hemorrhages (___cm)] : no petechial hemorrhages [] : no ischemic changes [Deep Tendon Reflexes (DTR)] : deep tendon reflexes were 2+ and symmetric [Sensation] : the sensory exam was normal to light touch and pinprick [No Focal Deficits] : no focal deficits [Impaired Insight] : insight and judgment were intact [Oriented To Time, Place, And Person] : oriented to person, place, and time [Affect] : the affect was normal [Mood] : the mood was normal [FreeTextEntry1] : non icteric

## 2019-04-10 NOTE — PROCEDURE
[FreeTextEntry1] : \par PFT 3/11/2019\par normal flow rates\par  Normal lung volumes\par  DLCO 78 % pred \par \par CT chest February 25, 2019\par Right middle lobe nodule 1.6 x 1.2 cm\par Nodule context and mediastinum and there is a thin strand extending from the nodule to the anterior pleural space\par Mediastinal adenopathy\par \par ADDENDUM\par Data review March 12, 2019\par CBC within normal limits\par Alpha-1 antitrypsin titer normal range pending ANCA  ACE level asthma profile with Rast testing\par ANCA negative\par serum IgE 172\par Serum Rast testing March 18, 2019\par Positive Common ragweed\par Positive Jude grass\par Negative for mold\par Flu panel negative\par \par CO 23 ppm\par Spirometry 4/10/2019\par

## 2019-04-10 NOTE — REVIEW OF SYSTEMS
[Depression] : depression [Anxiety] : anxiety [Negative] : Sleep Disorder [de-identified] : BiPolar Disorder

## 2019-04-10 NOTE — DISCUSSION/SUMMARY
[FreeTextEntry1] : PET/CT scan March 19, 2019\par Right middle lobe nodule with FDG avid uptake with impression suspicious for malignancy\par Uptake reported in posterior midline nasopharynx avid asymmetric left perineal skin thickening with uptake at right inguinal crease nonspecific\par \par Clinical suspicion right middle lobe nodule is a primary stage I lung cancer\par Nasopharyngeal lesion with active uptake on the PET scan might be inflammatory but in a smoker rule out malignancy\par Patient is scheduled for a do nasopharyngeal resection and a right middle lobe VATS\par \par No pulmonary contraindications to undergo proposed surgical procedure\par Advised the significant benefit of tobacco cessation preoperatively\par will benefit from incentive spirometry preop\par Discussed tobacco cessation.  Options including nicotine supplements gums lozenges patches\par Addressed ramifications of Wellbutrin and Chantix.\par 10-minute intervention\par

## 2019-04-10 NOTE — HISTORY OF PRESENT ILLNESS
[Stable] : are stable [None] : The patient is currently asymptomatic [FreeTextEntry2] : 2 packs per day [FreeTextEntry1] : 39-year-old female\par  pulmonary evaluation for pulmonary nodule\par Underwent a breast MRI was noted to have an abnormality in the chest and subsequently had a formal CT scan of the chest\par CT chest and a 1.6 cm right middle lobe pulmonary nodule\par Unable to calculate fat content in the 1.6 cm pulmonary nodule\par Long-standing history of tobacco smoker approximately 54 year pack history of smoking up to 2 packs per day\par There is a history of bronchitis approximately one year ago\par States occasional wheeze with cough\par No history of pneumonia tuberculosis pulmonary embolic disease obstructive sleep apnea\par No reported sputum\par No hemoptysis\par No recent fevers chills or sweats\par No chest pain exertional chest pain\par Does complain of shortness of breath primarily with stair climbing inclines\par Lower extremity edema but does not has varicose veins\par No prior reported radiographic findings to the chest in the past\par Past medical history unremarkable\par No history of hypertension hypercholesterolemia diabetes kidney disease, and lactulose Crohn's, neurologic symptomatology\par History of bipolar disorder with anxiety depression\par \par ENT noted\par Cardiothoracic surgery noted

## 2019-04-14 ENCOUNTER — OUTPATIENT (OUTPATIENT)
Dept: OUTPATIENT SERVICES | Facility: HOSPITAL | Age: 39
LOS: 1 days | End: 2019-04-14
Payer: COMMERCIAL

## 2019-04-14 ENCOUNTER — APPOINTMENT (OUTPATIENT)
Dept: MRI IMAGING | Facility: IMAGING CENTER | Age: 39
End: 2019-04-14
Payer: COMMERCIAL

## 2019-04-14 DIAGNOSIS — Z98.890 OTHER SPECIFIED POSTPROCEDURAL STATES: Chronic | ICD-10-CM

## 2019-04-14 DIAGNOSIS — R91.1 SOLITARY PULMONARY NODULE: ICD-10-CM

## 2019-04-14 PROCEDURE — A9585: CPT

## 2019-04-14 PROCEDURE — 70553 MRI BRAIN STEM W/O & W/DYE: CPT

## 2019-04-14 PROCEDURE — 70553 MRI BRAIN STEM W/O & W/DYE: CPT | Mod: 26

## 2019-04-15 ENCOUNTER — INPATIENT (INPATIENT)
Facility: HOSPITAL | Age: 39
LOS: 0 days | Discharge: HOME | End: 2019-04-16
Attending: THORACIC SURGERY (CARDIOTHORACIC VASCULAR SURGERY) | Admitting: THORACIC SURGERY (CARDIOTHORACIC VASCULAR SURGERY)
Payer: COMMERCIAL

## 2019-04-15 ENCOUNTER — RESULT REVIEW (OUTPATIENT)
Age: 39
End: 2019-04-15

## 2019-04-15 ENCOUNTER — APPOINTMENT (OUTPATIENT)
Dept: THORACIC SURGERY | Facility: HOSPITAL | Age: 39
End: 2019-04-15

## 2019-04-15 ENCOUNTER — APPOINTMENT (OUTPATIENT)
Dept: OTOLARYNGOLOGY | Facility: HOSPITAL | Age: 39
End: 2019-04-15

## 2019-04-15 ENCOUNTER — TRANSCRIPTION ENCOUNTER (OUTPATIENT)
Age: 39
End: 2019-04-15

## 2019-04-15 VITALS
DIASTOLIC BLOOD PRESSURE: 69 MMHG | OXYGEN SATURATION: 98 % | HEART RATE: 71 BPM | HEIGHT: 64 IN | SYSTOLIC BLOOD PRESSURE: 105 MMHG | RESPIRATION RATE: 16 BRPM | WEIGHT: 139.99 LBS | TEMPERATURE: 98 F

## 2019-04-15 DIAGNOSIS — Z98.890 OTHER SPECIFIED POSTPROCEDURAL STATES: Chronic | ICD-10-CM

## 2019-04-15 DIAGNOSIS — R91.1 SOLITARY PULMONARY NODULE: ICD-10-CM

## 2019-04-15 LAB
HCG UR QL: NEGATIVE — SIGNIFICANT CHANGE UP
RH IG SCN BLD-IMP: POSITIVE — SIGNIFICANT CHANGE UP

## 2019-04-15 PROCEDURE — 43191 ESOPHAGOSCOPY RIGID TRNSO DX: CPT

## 2019-04-15 PROCEDURE — 31237 NSL/SINS NDSC SURG BX POLYPC: CPT

## 2019-04-15 PROCEDURE — 31525 DX LARYNGOSCOPY EXCL NB: CPT | Mod: 59

## 2019-04-15 PROCEDURE — 71045 X-RAY EXAM CHEST 1 VIEW: CPT | Mod: 26

## 2019-04-15 PROCEDURE — 88331 PATH CONSLTJ SURG 1 BLK 1SPC: CPT | Mod: 26

## 2019-04-15 PROCEDURE — 88307 TISSUE EXAM BY PATHOLOGIST: CPT | Mod: 26

## 2019-04-15 PROCEDURE — 88305 TISSUE EXAM BY PATHOLOGIST: CPT | Mod: 26

## 2019-04-15 PROCEDURE — 32663 THORACOSCOPY W/LOBECTOMY: CPT | Mod: RT

## 2019-04-15 PROCEDURE — 88309 TISSUE EXAM BY PATHOLOGIST: CPT | Mod: 26

## 2019-04-15 PROCEDURE — 32668 THORACOSCOPY W/W RESECT DIAG: CPT

## 2019-04-15 RX ORDER — NICOTINE POLACRILEX 2 MG
1 GUM BUCCAL DAILY
Qty: 0 | Refills: 0 | Status: DISCONTINUED | OUTPATIENT
Start: 2019-04-15 | End: 2019-04-16

## 2019-04-15 RX ORDER — HYDROMORPHONE HYDROCHLORIDE 2 MG/ML
0.5 INJECTION INTRAMUSCULAR; INTRAVENOUS; SUBCUTANEOUS
Qty: 0 | Refills: 0 | Status: DISCONTINUED | OUTPATIENT
Start: 2019-04-15 | End: 2019-04-16

## 2019-04-15 RX ORDER — DEXAMETHASONE 0.5 MG/5ML
4 ELIXIR ORAL EVERY 6 HOURS
Qty: 0 | Refills: 0 | Status: DISCONTINUED | OUTPATIENT
Start: 2019-04-15 | End: 2019-04-16

## 2019-04-15 RX ORDER — FENTANYL CITRATE 50 UG/ML
50 INJECTION INTRAVENOUS
Qty: 0 | Refills: 0 | Status: DISCONTINUED | OUTPATIENT
Start: 2019-04-15 | End: 2019-04-16

## 2019-04-15 RX ORDER — FAMOTIDINE 10 MG/ML
20 INJECTION INTRAVENOUS
Qty: 0 | Refills: 0 | Status: DISCONTINUED | OUTPATIENT
Start: 2019-04-15 | End: 2019-04-16

## 2019-04-15 RX ORDER — NALOXONE HYDROCHLORIDE 4 MG/.1ML
0.1 SPRAY NASAL
Qty: 0 | Refills: 0 | Status: DISCONTINUED | OUTPATIENT
Start: 2019-04-15 | End: 2019-04-16

## 2019-04-15 RX ORDER — HEPARIN SODIUM 5000 [USP'U]/ML
5000 INJECTION INTRAVENOUS; SUBCUTANEOUS EVERY 8 HOURS
Qty: 0 | Refills: 0 | Status: DISCONTINUED | OUTPATIENT
Start: 2019-04-15 | End: 2019-04-16

## 2019-04-15 RX ORDER — SENNA PLUS 8.6 MG/1
2 TABLET ORAL AT BEDTIME
Qty: 0 | Refills: 0 | Status: DISCONTINUED | OUTPATIENT
Start: 2019-04-15 | End: 2019-04-16

## 2019-04-15 RX ORDER — KETOROLAC TROMETHAMINE 30 MG/ML
15 SYRINGE (ML) INJECTION ONCE
Qty: 0 | Refills: 0 | Status: DISCONTINUED | OUTPATIENT
Start: 2019-04-15 | End: 2019-04-16

## 2019-04-15 RX ORDER — CLONAZEPAM 1 MG
2 TABLET ORAL
Qty: 0 | Refills: 0 | Status: DISCONTINUED | OUTPATIENT
Start: 2019-04-15 | End: 2019-04-16

## 2019-04-15 RX ORDER — ONDANSETRON 8 MG/1
4 TABLET, FILM COATED ORAL ONCE
Qty: 0 | Refills: 0 | Status: DISCONTINUED | OUTPATIENT
Start: 2019-04-15 | End: 2019-04-16

## 2019-04-15 RX ORDER — LURASIDONE HYDROCHLORIDE 40 MG/1
20 TABLET ORAL DAILY
Qty: 0 | Refills: 0 | Status: DISCONTINUED | OUTPATIENT
Start: 2019-04-16 | End: 2019-04-16

## 2019-04-15 RX ORDER — DIPHENHYDRAMINE HCL 50 MG
25 CAPSULE ORAL EVERY 4 HOURS
Qty: 0 | Refills: 0 | Status: DISCONTINUED | OUTPATIENT
Start: 2019-04-15 | End: 2019-04-16

## 2019-04-15 RX ORDER — ONDANSETRON 8 MG/1
4 TABLET, FILM COATED ORAL EVERY 6 HOURS
Qty: 0 | Refills: 0 | Status: DISCONTINUED | OUTPATIENT
Start: 2019-04-15 | End: 2019-04-16

## 2019-04-15 RX ORDER — DOCUSATE SODIUM 100 MG
100 CAPSULE ORAL THREE TIMES A DAY
Qty: 0 | Refills: 0 | Status: DISCONTINUED | OUTPATIENT
Start: 2019-04-15 | End: 2019-04-16

## 2019-04-15 RX ORDER — HYDROMORPHONE HYDROCHLORIDE 2 MG/ML
30 INJECTION INTRAMUSCULAR; INTRAVENOUS; SUBCUTANEOUS
Qty: 0 | Refills: 0 | Status: DISCONTINUED | OUTPATIENT
Start: 2019-04-15 | End: 2019-04-16

## 2019-04-15 RX ADMIN — FAMOTIDINE 20 MILLIGRAM(S): 10 INJECTION INTRAVENOUS at 17:41

## 2019-04-15 RX ADMIN — HEPARIN SODIUM 5000 UNIT(S): 5000 INJECTION INTRAVENOUS; SUBCUTANEOUS at 22:57

## 2019-04-15 RX ADMIN — Medication 2 MILLIGRAM(S): at 18:47

## 2019-04-15 RX ADMIN — Medication 100 MILLIGRAM(S): at 22:57

## 2019-04-15 RX ADMIN — FENTANYL CITRATE 50 MICROGRAM(S): 50 INJECTION INTRAVENOUS at 23:12

## 2019-04-15 RX ADMIN — Medication 100 MILLIGRAM(S): at 15:32

## 2019-04-15 RX ADMIN — SENNA PLUS 2 TABLET(S): 8.6 TABLET ORAL at 22:57

## 2019-04-15 RX ADMIN — Medication 2 MILLIGRAM(S): at 23:02

## 2019-04-15 RX ADMIN — HYDROMORPHONE HYDROCHLORIDE 30 MILLILITER(S): 2 INJECTION INTRAMUSCULAR; INTRAVENOUS; SUBCUTANEOUS at 15:10

## 2019-04-15 RX ADMIN — HEPARIN SODIUM 5000 UNIT(S): 5000 INJECTION INTRAVENOUS; SUBCUTANEOUS at 15:32

## 2019-04-15 RX ADMIN — HYDROMORPHONE HYDROCHLORIDE 30 MILLILITER(S): 2 INJECTION INTRAMUSCULAR; INTRAVENOUS; SUBCUTANEOUS at 11:19

## 2019-04-15 RX ADMIN — HYDROMORPHONE HYDROCHLORIDE 30 MILLILITER(S): 2 INJECTION INTRAMUSCULAR; INTRAVENOUS; SUBCUTANEOUS at 19:21

## 2019-04-15 NOTE — BRIEF OPERATIVE NOTE - NSICDXBRIEFPREOP_GEN_ALL_CORE_FT
PRE-OP DIAGNOSIS:  Tracheal stenosis 15-Apr-2019 11:45:21  Kb Rivera  Cancer of lung 15-Apr-2019 09:45:10  Kb Rivera
PRE-OP DIAGNOSIS:  Cancer of lung 15-Apr-2019 09:45:10  Kb Rivera

## 2019-04-15 NOTE — BRIEF OPERATIVE NOTE - OPERATION/FINDINGS
as per PATH report flexible bronchoscopy; right uniportal VATS, RML wedge resection w/completion RML lobectomy

## 2019-04-15 NOTE — BRIEF OPERATIVE NOTE - NSICDXBRIEFPOSTOP_GEN_ALL_CORE_FT
POST-OP DIAGNOSIS:  Lung cancer 15-Apr-2019 09:48:03  Kb Rivera
POST-OP DIAGNOSIS:  Lung cancer 15-Apr-2019 09:48:03  Kb Rivera

## 2019-04-15 NOTE — BRIEF OPERATIVE NOTE - NSICDXBRIEFPROCEDURE_GEN_ALL_CORE_FT
PROCEDURES:  Bronchoscopy, adult 15-Apr-2019 11:43:21  Kb Rivera  VATS, with lobectomy 15-Apr-2019 09:44:42  Kb Rivera  Bronchoscopy, flexible, adult 15-Apr-2019 09:42:47  Kb Rivera
PROCEDURES:  VATS, with lobectomy 15-Apr-2019 09:44:42  Kb Rivera  Bronchoscopy, flexible, adult 15-Apr-2019 09:42:47  Kb Rivera

## 2019-04-16 ENCOUNTER — TRANSCRIPTION ENCOUNTER (OUTPATIENT)
Age: 39
End: 2019-04-16

## 2019-04-16 VITALS
OXYGEN SATURATION: 97 % | DIASTOLIC BLOOD PRESSURE: 54 MMHG | RESPIRATION RATE: 16 BRPM | SYSTOLIC BLOOD PRESSURE: 100 MMHG | HEART RATE: 66 BPM

## 2019-04-16 PROCEDURE — 71045 X-RAY EXAM CHEST 1 VIEW: CPT | Mod: 26

## 2019-04-16 PROCEDURE — 99238 HOSP IP/OBS DSCHRG MGMT 30/<: CPT

## 2019-04-16 PROCEDURE — 99253 IP/OBS CNSLTJ NEW/EST LOW 45: CPT

## 2019-04-16 RX ORDER — ACETAMINOPHEN 500 MG
1000 TABLET ORAL ONCE
Qty: 0 | Refills: 0 | Status: COMPLETED | OUTPATIENT
Start: 2019-04-16 | End: 2019-04-16

## 2019-04-16 RX ORDER — OXYCODONE HYDROCHLORIDE 5 MG/1
2 TABLET ORAL
Qty: 60 | Refills: 0
Start: 2019-04-16 | End: 2019-04-20

## 2019-04-16 RX ORDER — NICOTINE POLACRILEX 2 MG
1 GUM BUCCAL DAILY
Qty: 0 | Refills: 0 | Status: DISCONTINUED | OUTPATIENT
Start: 2019-04-16 | End: 2019-04-16

## 2019-04-16 RX ORDER — NICOTINE POLACRILEX 2 MG
1 GUM BUCCAL
Qty: 14 | Refills: 0
Start: 2019-04-16 | End: 2019-04-29

## 2019-04-16 RX ORDER — DOCUSATE SODIUM 100 MG
1 CAPSULE ORAL
Qty: 0 | Refills: 0 | DISCHARGE
Start: 2019-04-16

## 2019-04-16 RX ORDER — OXYCODONE HYDROCHLORIDE 5 MG/1
10 TABLET ORAL
Qty: 0 | Refills: 0 | Status: DISCONTINUED | OUTPATIENT
Start: 2019-04-16 | End: 2019-04-16

## 2019-04-16 RX ORDER — KETOROLAC TROMETHAMINE 30 MG/ML
15 SYRINGE (ML) INJECTION EVERY 6 HOURS
Qty: 0 | Refills: 0 | Status: DISCONTINUED | OUTPATIENT
Start: 2019-04-16 | End: 2019-04-16

## 2019-04-16 RX ORDER — OXYCODONE HYDROCHLORIDE 5 MG/1
5 TABLET ORAL
Qty: 0 | Refills: 0 | Status: DISCONTINUED | OUTPATIENT
Start: 2019-04-16 | End: 2019-04-16

## 2019-04-16 RX ORDER — SENNA PLUS 8.6 MG/1
2 TABLET ORAL
Qty: 0 | Refills: 0 | DISCHARGE
Start: 2019-04-16

## 2019-04-16 RX ADMIN — Medication 2 MILLIGRAM(S): at 05:35

## 2019-04-16 RX ADMIN — HYDROMORPHONE HYDROCHLORIDE 30 MILLILITER(S): 2 INJECTION INTRAMUSCULAR; INTRAVENOUS; SUBCUTANEOUS at 07:11

## 2019-04-16 RX ADMIN — Medication 100 MILLIGRAM(S): at 05:36

## 2019-04-16 RX ADMIN — FAMOTIDINE 20 MILLIGRAM(S): 10 INJECTION INTRAVENOUS at 05:37

## 2019-04-16 RX ADMIN — FENTANYL CITRATE 50 MICROGRAM(S): 50 INJECTION INTRAVENOUS at 00:07

## 2019-04-16 RX ADMIN — HEPARIN SODIUM 5000 UNIT(S): 5000 INJECTION INTRAVENOUS; SUBCUTANEOUS at 05:38

## 2019-04-16 RX ADMIN — Medication 15 MILLIGRAM(S): at 10:43

## 2019-04-16 RX ADMIN — Medication 15 MILLIGRAM(S): at 11:22

## 2019-04-16 NOTE — DISCHARGE NOTE PROVIDER - NSDCACTIVITY_GEN_ALL_CORE
Stairs allowed/Walking - Indoors allowed/Return to Work/School allowed/No heavy lifting/straining/Showering allowed/Sex allowed/Do not drive or operate machinery/Walking - Outdoors allowed Walking - Indoors allowed/Walking - Outdoors allowed/Stairs allowed/Do not drive or operate machinery/No heavy lifting/straining/Return to Work/School allowed/Do not make important decisions/Sex allowed/Showering allowed

## 2019-04-16 NOTE — DISCHARGE NOTE NURSING/CASE MANAGEMENT/SOCIAL WORK - NSDCFUADDAPPT_GEN_ALL_CORE_FT
See Dr Vasquez in 2 weeks- call for an appointment [phone] and bring a new chest X-ray with you. Have a chest xray done 1-2 days prior to your appointment. keep dressing dry and intact until Thursday, then remove dressing and shower. You may cover stitch with a band aid if desired. Suture will be removed in office.   Please call your Primary care provider to schedule a follow up appointment in 2 weeks

## 2019-04-16 NOTE — DISCHARGE NOTE PROVIDER - NSDCFUADDAPPT_GEN_ALL_CORE_FT
See Dr Vasquez in 2 weeks- call for an appointment and bring a new chest X-ray with you. See Dr Vasquez in 2 weeks- call for an appointment and bring a new chest X-ray with you. Have a chest xray done 1-2 days prior to your appointment. keep dressing dry and intact until Thurday, then remove dressing and shower. You m ay cover stitch with a band aid if desired. Please call your Primary care provider to schedule a follow up appointment in 2 weeks See Dr Vasquez in 2 weeks- call for an appointment [phone] and bring a new chest X-ray with you. Have a chest xray done 1-2 days prior to your appointment. keep dressing dry and intact until Thursday, then remove dressing and shower. You may cover stitch with a band aid if desired. Suture will be removed in office.   Please call your Primary care provider to schedule a follow up appointment in 2 weeks

## 2019-04-16 NOTE — DISCHARGE NOTE NURSING/CASE MANAGEMENT/SOCIAL WORK - NSDCDPATPORTLINK_GEN_ALL_CORE
You can access the [facility] Portal, offered by [facility], by registering with the following website: [email or web address]

## 2019-04-16 NOTE — DISCHARGE NOTE PROVIDER - CARE PROVIDERS DIRECT ADDRESSES
[email or web address]
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
## 2019-04-16 NOTE — DISCHARGE NOTE PROVIDER - HOSPITAL COURSE
38 y/o female presents to underwent a flexible bronchoscopy, right video assisted thoracoscopy RM Lobectomy on 4/15/19. An incidental nodule had been noted on imaging to done to follow     up a breast lump. Her  post-op course was routine and she was for discharge home after her chest tube was removed. 40 y/o female presents to underwent a flexible bronchoscopy, right video assisted thoracoscopy RM Lobectomy on 4/15/19. An incidental nodule had been noted on imaging to done to follow     up a breast lump. Her  post-op course was routine . Today Right CT removed. FU CXR showed minimal change in sml rt ptx. Pt feels well. Pain at CT and incision site.     OOB w min assist. VATS c/d/i. [facility].     Vital Signs Last 24 Hrs    T(C): 36.6 (16 Apr 2019 09:04), Max: 36.6 (15 Apr 2019 14:25)    T(F): 97.8 (16 Apr 2019 09:04), Max: 97.9 (15 Apr 2019 14:25)    HR: 63 (16 Apr 2019 09:04) (55 - 75)    BP: 85/56 (16 Apr 2019 09:04) (85/56 - 122/77)    BP(mean): 64 (15 Apr 2019 13:15) (64 - 88)    RR: 16 (16 Apr 2019 09:04) (14 - 23)    SpO2: 97% (16 Apr 2019 09:04) (89% - 100%)

## 2019-04-16 NOTE — DISCHARGE NOTE PROVIDER - NSDCFUADDINST_GEN_ALL_CORE_FT
Keep wound clean and dry with soap and water.  Allow to air dry. Call Dr Vasquez for any fevers, pus from wound or increasing redness.  The stitch will be removed at the office. Keep wound clean and dry with soap and water.  Allow to air dry. Call Dr Vasquez for any fevers, pus from wound or increasing redness.  The stitch will be removed at the office.  Walk 4-5 x per day. Increase as tolerated. You may climb stairs. Continue to use  incentive spirometer.

## 2019-04-16 NOTE — PROGRESS NOTE ADULT - SUBJECTIVE AND OBJECTIVE BOX
Subjective:  Pt is without complaints s/p R VATS & and ENT nasopharyngeal bx.  She denies significant pain and has not had to use her PCA very much.  She denies epistaxis.  She tolerated her diet and voided.    Vital Signs:  Vital Signs Last 24 Hrs  T(C): 36.6 (04-15-19 @ 21:03), Max: 36.7 (04-15-19 @ 10:15)  T(F): 97.8 (04-15-19 @ 21:03), Max: 98.1 (04-15-19 @ 10:15)  HR: 64 (04-15-19 @ 21:03) (58 - 77)  BP: 108/56 (04-15-19 @ 21:03) (105/69 - 140/88)  RR: 18 (04-15-19 @ 21:03) (12 - 23)  SpO2: 89% (04-15-19 @ 21:03) (89% - 98%) on (O2)    General: WN/ WD NAD  Neurology: Awake, nonfocal, MERAZ x 4  Eyes: Scleras clear, Gross vision intact  ENT: Gross hearing intact, grossly patent pharynx, no stridor  Neck: Neck supple, trachea midline, No JVD,   Respiratory: CTA B/L with decreased BS on the R; No wheezing, rales, rhonchi  CV: RRR, S1, S2, no murmurs  Abdominal: Soft, NT, ND +BS,   Extremities: No edema, + peripheral pulses  Skin: No Rashes, Hematoma, Ecchymosis  Psych: Oriented x 3, normal affect  Tubes: R chest tube in place to WS; No air leak; minimal serosanguinous drainage    Relevant labs, radiology and Medications reviewed  CXR: No PTX    MEDICATIONS  (STANDING):  clonazePAM Tablet 2 milliGRAM(s) Oral four times a day  docusate sodium 100 milliGRAM(s) Oral three times a day  famotidine    Tablet 20 milliGRAM(s) Oral two times a day  heparin  Injectable 5000 Unit(s) SubCutaneous every 8 hours  HYDROmorphone PCA (1 mG/mL) 30 milliLiter(s) PCA Continuous PCA Continuous  lactated ringers. 1000 milliLiter(s) (30 mL/Hr) IV Continuous <Continuous>  lurasidone 20 milliGRAM(s) Oral daily  nicotine - 21 mG/24Hr(s) Patch 1 patch Transdermal daily  PARoxetine 20 milliGRAM(s) Oral daily  senna 2 Tablet(s) Oral at bedtime    MEDICATIONS  (PRN):  dexamethasone  Injectable 4 milliGRAM(s) IV Push every 6 hours PRN Nausea, IF ondansetron is ineffective after 30 - 60 minute  diphenhydrAMINE   Injectable 25 milliGRAM(s) IV Push every 4 hours PRN Pruritus  fentaNYL    Injectable 50 MICROGram(s) IV Push every 10 minutes PRN Severe Pain (7 - 10)  HYDROmorphone PCA (1 mG/mL) Rescue Clinician Bolus 0.5 milliGRAM(s) IV Push every 15 minutes PRN for Pain Scale GREATER THAN 6  ketorolac   Injectable 15 milliGRAM(s) IV Push once PRN Moderate Pain (4 - 6)  naloxone Injectable 0.1 milliGRAM(s) IV Push every 3 minutes PRN For ANY of the following changes in patient status:  A. RR LESS THAN 10 breaths per minute, B. Oxygen saturation LESS THAN 90%, C. Sedation score of 6  ondansetron Injectable 4 milliGRAM(s) IV Push every 6 hours PRN Nausea  ondansetron Injectable 4 milliGRAM(s) IV Push once PRN Nausea and/or Vomiting    Pertinent Physical Exam  I&O's Summary    15 Apr 2019 07:01  -  16 Apr 2019 00:41  --------------------------------------------------------  IN: 720 mL / OUT: 955 mL / NET: -235 mL        Assessment  39y Female stable s/p R VATS, RMLobectomy    PLAN  Neuro: Pain management  Pulm: Encourage coughing, deep breathing and use of incentive spirometry. Daily CXR.   Cardio: Monitor telemetry/alarms  GI: Tolerating diet. Continue stool softeners.  Renal: monitor urine output, supplement electrolytes as needed  Vasc: Heparin SC/ SCDs for DVT prophylaxis  Heme: Follow H/H  ID: Off antibiotics. Stable.  Therapy: OOB/ambulate  Tubes: Monitor Chest tube output  Disposition: Aim to D/C to home after chest tube removal, possibly today  To  be discussed with Thoracic Surgery Team at AM rounds.
Anesthesia Pain Management Service    SUBJECTIVE: I'm in a lot of pain.     Pain Scale Score	At rest: __9_ 	With Activity: ___ 	[X ] Refer to charted pain scores    THERAPY:    [ ] IV PCA Morphine		[ ] 5 mg/mL	[ ] 1 mg/mL  [X ] IV PCA Hydromorphone	[ ] 5 mg/mL	[X ] 1 mg/mL  [ ] IV PCA Fentanyl		[ ] 50 micrograms/mL    Demand dose __0.2_ lockout __6_ (minutes) Continuous Rate _0__ Total: _3.7__   mg used (in past 24 hrs)      MEDICATIONS  (STANDING):  acetaminophen  IVPB .. 1000 milliGRAM(s) IV Intermittent once  clonazePAM Tablet 2 milliGRAM(s) Oral four times a day  docusate sodium 100 milliGRAM(s) Oral three times a day  famotidine    Tablet 20 milliGRAM(s) Oral two times a day  heparin  Injectable 5000 Unit(s) SubCutaneous every 8 hours  ketorolac   Injectable 15 milliGRAM(s) IV Push every 6 hours  lurasidone 20 milliGRAM(s) Oral daily  nicotine - 21 mG/24Hr(s) Patch 1 patch Transdermal daily  PARoxetine 20 milliGRAM(s) Oral daily  senna 2 Tablet(s) Oral at bedtime    MEDICATIONS  (PRN):  dexamethasone  Injectable 4 milliGRAM(s) IV Push every 6 hours PRN Nausea, IF ondansetron is ineffective after 30 - 60 minute  diphenhydrAMINE   Injectable 25 milliGRAM(s) IV Push every 4 hours PRN Pruritus  naloxone Injectable 0.1 milliGRAM(s) IV Push every 3 minutes PRN For ANY of the following changes in patient status:  A. RR LESS THAN 10 breaths per minute, B. Oxygen saturation LESS THAN 90%, C. Sedation score of 6  ondansetron Injectable 4 milliGRAM(s) IV Push every 6 hours PRN Nausea  oxyCODONE    IR 5 milliGRAM(s) Oral every 3 hours PRN Moderate Pain (4 - 6)  oxyCODONE    IR 10 milliGRAM(s) Oral every 3 hours PRN Severe Pain (7 - 10)      OBJECTIVE: sitting in bed     Sedation Score:	[ X] Alert	[ ] Drowsy 	[ ] Arousable	[ ] Asleep	[ ] Unresponsive    Side Effects:	[X ] None	[ ] Nausea	[ ] Vomiting	[ ] Pruritus  		[ ] Other:    Vital Signs Last 24 Hrs  T(C): 36.6 (16 Apr 2019 09:04), Max: 36.6 (15 Apr 2019 14:25)  T(F): 97.8 (16 Apr 2019 09:04), Max: 97.9 (15 Apr 2019 14:25)  HR: 63 (16 Apr 2019 09:04) (55 - 75)  BP: 85/56 (16 Apr 2019 09:04) (85/56 - 140/88)  BP(mean): 64 (15 Apr 2019 13:15) (64 - 101)  RR: 16 (16 Apr 2019 09:04) (13 - 23)  SpO2: 97% (16 Apr 2019 09:04) (89% - 100%)    ASSESSMENT/ PLAN    Therapy to  be:	[ ] Continue   [ X] Discontinued   [X ] Change to prn Analgesics    Documentation and Verification of current medications:   [X] Done	[ ] Not done, not elligible    Comments: PRN Oral/IV opioids and/or Adjuvant medication to be ordered at this point.

## 2019-04-17 ENCOUNTER — EMERGENCY (EMERGENCY)
Facility: HOSPITAL | Age: 39
LOS: 1 days | Discharge: LEFT BEFORE DISCHARGE | End: 2019-04-17
Attending: EMERGENCY MEDICINE | Admitting: EMERGENCY MEDICINE
Payer: COMMERCIAL

## 2019-04-17 VITALS
SYSTOLIC BLOOD PRESSURE: 125 MMHG | OXYGEN SATURATION: 100 % | HEART RATE: 78 BPM | TEMPERATURE: 98 F | DIASTOLIC BLOOD PRESSURE: 76 MMHG | RESPIRATION RATE: 16 BRPM

## 2019-04-17 VITALS
OXYGEN SATURATION: 99 % | DIASTOLIC BLOOD PRESSURE: 55 MMHG | TEMPERATURE: 98 F | SYSTOLIC BLOOD PRESSURE: 102 MMHG | RESPIRATION RATE: 14 BRPM | HEART RATE: 75 BPM

## 2019-04-17 DIAGNOSIS — R55 SYNCOPE AND COLLAPSE: ICD-10-CM

## 2019-04-17 DIAGNOSIS — R91.1 SOLITARY PULMONARY NODULE: ICD-10-CM

## 2019-04-17 DIAGNOSIS — Z98.890 OTHER SPECIFIED POSTPROCEDURAL STATES: Chronic | ICD-10-CM

## 2019-04-17 LAB
ALBUMIN SERPL ELPH-MCNC: 3.7 G/DL — SIGNIFICANT CHANGE UP (ref 3.3–5)
ALP SERPL-CCNC: 64 U/L — SIGNIFICANT CHANGE UP (ref 40–120)
ALT FLD-CCNC: 8 U/L — SIGNIFICANT CHANGE UP (ref 4–33)
ANION GAP SERPL CALC-SCNC: 11 MMO/L — SIGNIFICANT CHANGE UP (ref 7–14)
AST SERPL-CCNC: 15 U/L — SIGNIFICANT CHANGE UP (ref 4–32)
BASOPHILS # BLD AUTO: 0.04 K/UL — SIGNIFICANT CHANGE UP (ref 0–0.2)
BASOPHILS NFR BLD AUTO: 0.4 % — SIGNIFICANT CHANGE UP (ref 0–2)
BILIRUB SERPL-MCNC: 0.2 MG/DL — SIGNIFICANT CHANGE UP (ref 0.2–1.2)
BUN SERPL-MCNC: 13 MG/DL — SIGNIFICANT CHANGE UP (ref 7–23)
CALCIUM SERPL-MCNC: 8.4 MG/DL — SIGNIFICANT CHANGE UP (ref 8.4–10.5)
CHLORIDE SERPL-SCNC: 105 MMOL/L — SIGNIFICANT CHANGE UP (ref 98–107)
CO2 SERPL-SCNC: 25 MMOL/L — SIGNIFICANT CHANGE UP (ref 22–31)
CREAT SERPL-MCNC: 0.8 MG/DL — SIGNIFICANT CHANGE UP (ref 0.5–1.3)
D DIMER BLD IA.RAPID-MCNC: 1223 NG/ML — SIGNIFICANT CHANGE UP
EOSINOPHIL # BLD AUTO: 0.09 K/UL — SIGNIFICANT CHANGE UP (ref 0–0.5)
EOSINOPHIL NFR BLD AUTO: 0.8 % — SIGNIFICANT CHANGE UP (ref 0–6)
GLUCOSE SERPL-MCNC: 107 MG/DL — HIGH (ref 70–99)
HCG SERPL-ACNC: < 5 MIU/ML — SIGNIFICANT CHANGE UP
HCT VFR BLD CALC: 37.7 % — SIGNIFICANT CHANGE UP (ref 34.5–45)
HGB BLD-MCNC: 12.2 G/DL — SIGNIFICANT CHANGE UP (ref 11.5–15.5)
IMM GRANULOCYTES NFR BLD AUTO: 0.3 % — SIGNIFICANT CHANGE UP (ref 0–1.5)
LYMPHOCYTES # BLD AUTO: 1.83 K/UL — SIGNIFICANT CHANGE UP (ref 1–3.3)
LYMPHOCYTES # BLD AUTO: 16.5 % — SIGNIFICANT CHANGE UP (ref 13–44)
MCHC RBC-ENTMCNC: 29.3 PG — SIGNIFICANT CHANGE UP (ref 27–34)
MCHC RBC-ENTMCNC: 32.4 % — SIGNIFICANT CHANGE UP (ref 32–36)
MCV RBC AUTO: 90.4 FL — SIGNIFICANT CHANGE UP (ref 80–100)
MONOCYTES # BLD AUTO: 0.81 K/UL — SIGNIFICANT CHANGE UP (ref 0–0.9)
MONOCYTES NFR BLD AUTO: 7.3 % — SIGNIFICANT CHANGE UP (ref 2–14)
NEUTROPHILS # BLD AUTO: 8.29 K/UL — HIGH (ref 1.8–7.4)
NEUTROPHILS NFR BLD AUTO: 74.7 % — SIGNIFICANT CHANGE UP (ref 43–77)
NRBC # FLD: 0 K/UL — SIGNIFICANT CHANGE UP (ref 0–0)
PLATELET # BLD AUTO: 227 K/UL — SIGNIFICANT CHANGE UP (ref 150–400)
PMV BLD: 9.6 FL — SIGNIFICANT CHANGE UP (ref 7–13)
POTASSIUM SERPL-MCNC: 3.4 MMOL/L — LOW (ref 3.5–5.3)
POTASSIUM SERPL-SCNC: 3.4 MMOL/L — LOW (ref 3.5–5.3)
PROT SERPL-MCNC: 6 G/DL — SIGNIFICANT CHANGE UP (ref 6–8.3)
RBC # BLD: 4.17 M/UL — SIGNIFICANT CHANGE UP (ref 3.8–5.2)
RBC # FLD: 12.8 % — SIGNIFICANT CHANGE UP (ref 10.3–14.5)
SODIUM SERPL-SCNC: 141 MMOL/L — SIGNIFICANT CHANGE UP (ref 135–145)
TROPONIN T, HIGH SENSITIVITY: 7 NG/L — SIGNIFICANT CHANGE UP (ref ?–14)
TROPONIN T, HIGH SENSITIVITY: 8 NG/L — SIGNIFICANT CHANGE UP (ref ?–14)
WBC # BLD: 11.09 K/UL — HIGH (ref 3.8–10.5)
WBC # FLD AUTO: 11.09 K/UL — HIGH (ref 3.8–10.5)

## 2019-04-17 PROCEDURE — 99285 EMERGENCY DEPT VISIT HI MDM: CPT | Mod: 25

## 2019-04-17 PROCEDURE — 71046 X-RAY EXAM CHEST 2 VIEWS: CPT | Mod: 26

## 2019-04-17 PROCEDURE — 93010 ELECTROCARDIOGRAM REPORT: CPT | Mod: 59

## 2019-04-17 RX ORDER — SODIUM CHLORIDE 9 MG/ML
1000 INJECTION INTRAMUSCULAR; INTRAVENOUS; SUBCUTANEOUS ONCE
Qty: 0 | Refills: 0 | Status: COMPLETED | OUTPATIENT
Start: 2019-04-17 | End: 2019-04-17

## 2019-04-17 RX ADMIN — SODIUM CHLORIDE 1000 MILLILITER(S): 9 INJECTION INTRAMUSCULAR; INTRAVENOUS; SUBCUTANEOUS at 07:21
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
## 2019-04-17 NOTE — ED ADULT TRIAGE NOTE - CHIEF COMPLAINT QUOTE
pt s/p syncopal episode this am. states "I just went down" denies chest pain or dizziness prior to episode, was d/c here yesterday s/p right lower lobectomy and was taken off coumadin then. no obvious s/s injury. denies chest pain. ha, lightheadedness. as per ems, othostatic hypotension noted on their vs.

## 2019-04-17 NOTE — ED PROVIDER NOTE - NSFOLLOWUPINSTRUCTIONS_ED_ALL_ED_FT
Follow up with primary care physician in next 24 hours. Return to ED immediately for any worsening symptoms.

## 2019-04-17 NOTE — ED PROVIDER NOTE - ATTENDING CONTRIBUTION TO CARE
39F PMH lumpectomy, also s/p VATS/R middle lobectomy 2d ago (routine imaging found lung nodule) p/w LOC. Since the lobectomy pt has minimal cough, SOB and L shoulder pain - this is unchanged. Pt dc'd yesterday afternoon and was feeling ok. Awoke this morning feeling ok. Walked downstairs, was making coffee then felt sudden onset of lightheadedness and then remembers waking up on floor. [place] ok so she stood up and then immediately felt lightheaded and had LOC again. C/o minimal L jaw pain since falling down. Otherwise feels back to her usual self. States that yesterday her BP was lower than usual. HAs hx of multiple prior syncopal events (in setting of doctor visits, recent hospitalizations) - states she had cardiac w/u including echo (?holter) that was unrevealing. Also FMH of many relatives w/ easy syncope - no sudden deaths. BP slightly low, other vitals wnl. Exam as above.  ddx: Likely benign syncope. Possible dehydration. Clinically no significant subsequent trauma.   CBC, cmp, trop, IVF, CXR. CT surg consult, reassess.  Given prior w/u, no murmurs on exam and hx of multiple prior - if ED w/u wnl then likely low yield of admission.  Will reassess.

## 2019-04-17 NOTE — CONSULT NOTE ADULT - PROBLEM SELECTOR RECOMMENDATION 2
s/p VATS/R middle lobectomy (lung nodule on routine imaging) 4/15/19 with Dr. Vasquez  As above, discussed with Dr. Vasquez

## 2019-04-17 NOTE — ED ADULT NURSE NOTE - NSIMPLEMENTINTERV_GEN_ALL_ED
Implemented All Fall Risk Interventions:  [place] to call system. Call bell, personal items and telephone within reach. Instruct patient to call for assistance. Room bathroom lighting operational. Non-slip footwear when patient is off stretcher. Physically safe environment: no spills, clutter or unnecessary equipment. Stretcher in lowest position, wheels locked, appropriate side rails in place. Provide visual cue, wrist band, yellow gown, etc. Monitor gait and stability. Monitor for mental status changes and reorient to person, place, and time. Review medications for side effects contributing to fall risk. Reinforce activity limits and safety measures with patient and family.

## 2019-04-17 NOTE — ED PROVIDER NOTE - PROGRESS NOTE DETAILS
Luz VALERIO (fellow): reinterviewed pt earlier, denied having preceding symptoms, said today's episodes felt dissimilar to prior syncopal episodes she has had, no hemoptysis, no leg swelling, no prior clot hx. Dimer ordered for low risk wells, elevated, CTA ordered. seen by CTsx, don't think symptoms related to recent procedure, advise that elevated dimer likely 2/2 procedure. CTA has been ordered, pt does not want to wait for results. explicity expressed understanding and acceptance of risks of leaving including inability to diagnose PE leading to possible recurrence of symptoms, deterioration, or death, while continuing to stay would allow for further diagnostic studies and potential treatment, leading to avoidance of said risks. pt demonstrated decision making capacity and wishes to sign out AMA

## 2019-04-17 NOTE — ED PROVIDER NOTE - PHYSICAL EXAMINATION
no LE edema, normal equal distal pulses.  R mid lung slight crackles.   R lateral chest w/ dressing over chest tube placement site (chest tube already removed).   no jaw ttp, jaw FROM No spinal ttp, neck FROM. Strength 5/5. No bony ttp, FROM all extremities. Normal equal distal pulses.

## 2019-04-17 NOTE — ED ADULT NURSE REASSESSMENT NOTE - NS ED NURSE REASSESS COMMENT FT1
Received report from overnight nurse. Pt AxOx4 with positive ROM in upper and lower extremities. Breathing is even and unlabored, pt satting well on RA. Pt denies lightheadedness, dizziness, SOB, CP, H/A, blurry vision at this time. Vitals noted and stable, MD at bedside, will continue to monitor.

## 2019-04-17 NOTE — ED ADULT NURSE REASSESSMENT NOTE - NS ED NURSE REASSESS COMMENT FT1
Pt at baseline mental status, ambulated to bathroom without complaints. Pt denies dizziness, lightheadedness, H/A, CP, SOB. Pt awaiting CT angio. MD at bedside, will continue to monitor.

## 2019-04-17 NOTE — CONSULT NOTE ADULT - ASSESSMENT
39F PMH lumpectomy,  s/p VATS/R middle lobectomy (lung nodule on routine imaging) 4/15/19 with Dr. Vasquez post op course uncomplicated discharged yesterday 4/16/19 without issue. Pt presents to ED c/o fo syncope and LOC.

## 2019-04-17 NOTE — ED PROVIDER NOTE - OBJECTIVE STATEMENT
39F PMH lumpectomy, also s/p VATS/R middle lobectomy 2d ago (routine imaging found lung nodule) p/w LOC. Since the lobectomy pt has minimal cough, SOB and L shoulder pain - this is unchanged. Pt dc'd yesterday afternoon and was feeling ok. Awoke this morning feeling ok. Walked downstairs, was making coffee then felt sudden onset of lightheadedness and then remembers waking up on floor. [place] ok so she stood up and then immediately felt lightheaded and had LOC again. C/o minimal L jaw pain since falling down. Otherwise feels back to her usual self. States that yesterday her BP was lower than usual. HAs hx of multiple prior syncopal events (in setting of doctor visits, recent hospitalizations) - states she had cardiac w/u including echo (?holter) that was unrevealing. Also FMH of many relatives w/ easy syncope - no sudden deaths. [place] f/c, CP (other than near chest tube placement), black/bloody stool, NVD, abd pain, urinary complaints, LE pain/swelling, focal weakness/numbness.   Took oxycodone 10mg prior to going to sleep.
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
## 2019-04-17 NOTE — ED ADULT NURSE REASSESSMENT NOTE - NS ED NURSE REASSESS COMMENT FT1
Pt. states she wants to leave AMA. Denies any cp, sob or dizziness at this time. VSS, breathing well on RA. respirations even and unlabored. MD aware.Will continue to monitor.

## 2019-04-17 NOTE — ED PROVIDER NOTE - OTHER FINDINGS
q3, non-specific flattening. grossly unchanged from prior but slightly lmtd 2/2 background movement.

## 2019-04-17 NOTE — CONSULT NOTE ADULT - PROBLEM SELECTOR RECOMMENDATION 9
Patient is not in any discomfort and vitals stable  EKG - NSR, Labs - WNL, CXR WNL  Trending Trops in ED   D- dimer elevated > pending CTA of chest to r/o PE per ER   Would r/o cardiac dz for cause of syncope  It does not appear this syncopal episode is related to recent thoracic surgery (incision c/d/i), no sings of acute bleeding, cp, sob.   If CTA chest stable there is no further intervention from thoracic surgery standpoint   Disposition per ED

## 2019-04-18 LAB — SURGICAL PATHOLOGY STUDY: SIGNIFICANT CHANGE UP

## 2019-04-23 ENCOUNTER — APPOINTMENT (OUTPATIENT)
Dept: THORACIC SURGERY | Facility: CLINIC | Age: 39
End: 2019-04-23
Payer: COMMERCIAL

## 2019-04-23 VITALS
TEMPERATURE: 98.5 F | SYSTOLIC BLOOD PRESSURE: 92 MMHG | RESPIRATION RATE: 17 BRPM | WEIGHT: 140 LBS | HEART RATE: 83 BPM | OXYGEN SATURATION: 100 % | DIASTOLIC BLOOD PRESSURE: 62 MMHG | HEIGHT: 64 IN | BODY MASS INDEX: 23.9 KG/M2

## 2019-04-23 PROCEDURE — 99024 POSTOP FOLLOW-UP VISIT: CPT

## 2019-04-23 RX ORDER — ALBUTEROL SULFATE 90 UG/1
108 (90 BASE) AEROSOL, METERED RESPIRATORY (INHALATION)
Qty: 1 | Refills: 3 | Status: DISCONTINUED | COMMUNITY
Start: 2019-03-11 | End: 2019-04-23

## 2019-04-29 DIAGNOSIS — G89.18 OTHER ACUTE POSTPROCEDURAL PAIN: ICD-10-CM

## 2019-05-09 ENCOUNTER — APPOINTMENT (OUTPATIENT)
Dept: PULMONOLOGY | Facility: CLINIC | Age: 39
End: 2019-05-09

## 2019-05-14 ENCOUNTER — APPOINTMENT (OUTPATIENT)
Dept: OTOLARYNGOLOGY | Facility: CLINIC | Age: 39
End: 2019-05-14

## 2019-06-07 NOTE — ED ADULT NURSE NOTE - NS ED NURSE RECORD ANOTHER VITAL SIGN
Internal Medicine
Intervent Radiology
Intervent Radiology
Nephrology
Internal Medicine
Internal Medicine
Yes

## 2019-07-19 ENCOUNTER — TRANSCRIPTION ENCOUNTER (OUTPATIENT)
Age: 39
End: 2019-07-19

## 2019-07-23 ENCOUNTER — APPOINTMENT (OUTPATIENT)
Dept: THORACIC SURGERY | Facility: CLINIC | Age: 39
End: 2019-07-23
Payer: COMMERCIAL

## 2019-07-23 VITALS
DIASTOLIC BLOOD PRESSURE: 71 MMHG | HEART RATE: 68 BPM | HEIGHT: 64 IN | RESPIRATION RATE: 16 BRPM | WEIGHT: 141 LBS | BODY MASS INDEX: 24.07 KG/M2 | SYSTOLIC BLOOD PRESSURE: 107 MMHG | OXYGEN SATURATION: 98 % | TEMPERATURE: 97.7 F

## 2019-07-23 PROCEDURE — 99214 OFFICE O/P EST MOD 30 MIN: CPT

## 2019-07-23 RX ORDER — OXYCODONE 5 MG/1
5 TABLET ORAL
Qty: 42 | Refills: 0 | Status: DISCONTINUED | COMMUNITY
Start: 2019-04-16 | End: 2019-07-23

## 2019-07-23 RX ORDER — OXYCODONE 5 MG/1
5 TABLET ORAL EVERY 8 HOURS
Qty: 12 | Refills: 0 | Status: DISCONTINUED | COMMUNITY
Start: 2019-04-29 | End: 2019-07-23

## 2019-07-23 RX ORDER — CLONAZEPAM 1 MG/1
1 TABLET ORAL
Refills: 0 | Status: DISCONTINUED | COMMUNITY
End: 2019-07-23

## 2019-07-24 NOTE — PHYSICAL EXAM
[Sclera] : the sclera and conjunctiva were normal [Extraocular Movements] : extraocular movements were intact [Neck Appearance] : the appearance of the neck was normal [Neck Cervical Mass (___cm)] : no neck mass was observed [Jugular Venous Distention Increased] : there was no jugular-venous distention [] : no respiratory distress [Respiration, Rhythm And Depth] : normal respiratory rhythm and effort [Exaggerated Use Of Accessory Muscles For Inspiration] : no accessory muscle use [Auscultation Breath Sounds / Voice Sounds] : lungs were clear to auscultation bilaterally [Heart Rate And Rhythm] : heart rate was normal and rhythm regular [Diminished Respiratory Excursion] : normal chest expansion [Bowel Sounds] : normal bowel sounds [Abdomen Soft] : soft [Abdomen Tenderness] : non-tender [Cervical Lymph Nodes Enlarged Posterior Bilaterally] : posterior cervical [Supraclavicular Lymph Nodes Enlarged Bilaterally] : supraclavicular [Cervical Lymph Nodes Enlarged Anterior Bilaterally] : anterior cervical [Abnormal Walk] : normal gait [Skin Color & Pigmentation] : normal skin color and pigmentation [No Focal Deficits] : no focal deficits [Oriented To Time, Place, And Person] : oriented to person, place, and time [Impaired Insight] : insight and judgment were intact [Affect] : the affect was normal [Mood] : the mood was normal

## 2019-07-25 ENCOUNTER — APPOINTMENT (OUTPATIENT)
Dept: SPINE | Facility: CLINIC | Age: 39
End: 2019-07-25

## 2019-07-27 NOTE — CONSULT LETTER
[FreeTextEntry2] : Dr. Robert Grimes (Pulm/Ref)\par Dr. Jonas Carmen (PCP) \par  [FreeTextEntry3] : \par \par \par Santosh Vasquez MD, FACS \par Chief, Division of Thoracic Surgery \par Director, Minimally Invasive Thoracic Surgery \par Department of Cardiovascular and Thoracic Surgery \par [facility] \par , Cardiovascular and Thoracic Surgery

## 2019-07-27 NOTE — HISTORY OF PRESENT ILLNESS
[FreeTextEntry1] : Shelby Cleary is a 39 year old female (hx 2 PPD x 27 years) s/p uniportal Right VATS, RML wedge resection x1, RMLobectomy on 4/15/19, pathology of RML revealed typical carcinoid, T1bNx. Additionally, nasopharynx biopsy revealed reactive lymphoid hyperplasia.  \par \par She is a current smoker with a history of bipolar disorder, lumpectomy for benign breast lesion, and cervical spine surgery (anterior-cervical discectomy). Lung mass was incidentally found during workup for breast lesion. \par \par She was discharged home on 4/16/19. She presented to the ER on 4/17/19 after a syncopal episode - workup revealed an elevated D-dimer, however, she left AMA prior to CT Chest. She states that her syncopal episode was due to taking all of her medications at once.\par \par Chest CT on 7/8/19:\par - surgical sutures along the medial right major fissure and surgical clips within the right hilum\par - there is thickening of the right major and mild post-surgical scarring adjacent to the surgical sutures along the right major fissure.\par - no recurrent mass is identified within the right lung\par - the left lung is clear\par - No infiltrates, consolidations or endobronchial lesions are seen\par \par CXR on 4/17/19 revealed: \par - No effusions or pneumothorax\par - Streaky opacities overlying the lung bases unchanged likely atelectasis\par \par She is currently taking Chantix and trying to quit smoking. She has cut down to less than one pack a day. She reports shortness of breath with exertion. The patient denies fever, chills, syncope, dysphagia or hemoptysis.

## 2019-07-27 NOTE — ASSESSMENT
[FreeTextEntry1] : Ms. Cleary is a 39 year old female s/p Right VATS, RML lobectomy on 4/15/19, pathology of RML revealed typical carcinoid, T1bMx. Additionally, nasopharynx biopsy revealed reactive lymphoid hyperplasia. She is a current smoker with a history of bipolar disorder, lumpectomy for benign breast lesion, and cervical spine surgery (anterior-cervical discectomy). Lung mass was incidentally found during workup for breast lesion. \par \par Chest CT on 7/8/19 reveals surgical sutures along the medial right major fissure and surgical clips within the right hilum. There is thickening of the right major and mild post-surgical scarring adjacent to the surgical sutures along the right major fissure. No recurrent mass is identified within the right lung.\par \par I have reviewed the patient's medical records and diagnostic images during the time of this office visit, and I have made the following recommendation. I have recommended the patient follow up in 3 months with a CXR. Smoking cessation strongly advised. \par \par Written by Theresa Montgomery NP, acting as a scribe for Santosh Vasquez MD.\par The documentation recorded by the scribe accurately reflects the service I personally performed and the decisions made by me. Santosh Vasquez MD\par \par

## 2019-10-22 ENCOUNTER — APPOINTMENT (OUTPATIENT)
Dept: THORACIC SURGERY | Facility: CLINIC | Age: 39
End: 2019-10-22
Payer: COMMERCIAL

## 2019-10-22 VITALS
RESPIRATION RATE: 16 BRPM | WEIGHT: 141 LBS | HEIGHT: 64 IN | SYSTOLIC BLOOD PRESSURE: 103 MMHG | TEMPERATURE: 97.9 F | HEART RATE: 87 BPM | BODY MASS INDEX: 24.07 KG/M2 | DIASTOLIC BLOOD PRESSURE: 72 MMHG | OXYGEN SATURATION: 96 %

## 2019-10-22 PROCEDURE — 99214 OFFICE O/P EST MOD 30 MIN: CPT

## 2019-10-22 NOTE — PHYSICAL EXAM
[Sclera] : the sclera and conjunctiva were normal [Neck Appearance] : the appearance of the neck was normal [] : the neck was supple [Neck Cervical Mass (___cm)] : no neck mass was observed [Respiration, Rhythm And Depth] : normal respiratory rhythm and effort [Auscultation Breath Sounds / Voice Sounds] : lungs were clear to auscultation bilaterally [Heart Rate And Rhythm] : heart rate was normal and rhythm regular [Heart Sounds] : normal S1 and S2 [Examination Of The Chest] : the chest was normal in appearance [Abdomen Soft] : soft [Abdomen Tenderness] : non-tender [Cervical Lymph Nodes Enlarged Posterior Bilaterally] : posterior cervical [Supraclavicular Lymph Nodes Enlarged Bilaterally] : supraclavicular [Cervical Lymph Nodes Enlarged Anterior Bilaterally] : anterior cervical [No CVA Tenderness] : no ~M costovertebral angle tenderness [Abnormal Walk] : normal gait [No Focal Deficits] : no focal deficits [Skin Color & Pigmentation] : normal skin color and pigmentation [Oriented To Time, Place, And Person] : oriented to person, place, and time [Impaired Insight] : insight and judgment were intact [Affect] : the affect was normal

## 2019-10-22 NOTE — CONSULT LETTER
[Dear  ___] : Dear  [unfilled], [Courtesy Letter:] : I had the pleasure of seeing your patient, [unfilled], in my office today. [Please see my note below.] : Please see my note below. [Sincerely,] : Sincerely, [FreeTextEntry2] : Dr. Robert Grimes (Pulm/Ref)\par Dr. Jonas Carmen (PCP) \par   [FreeTextEntry3] : \par \par \par Santosh Vasquez MD, FACS \par Chief, Division of Thoracic Surgery \par Director, Minimally Invasive Thoracic Surgery \par Department of Cardiovascular and Thoracic Surgery \par [facility] \par , Cardiovascular and Thoracic Surgery

## 2019-10-22 NOTE — ASSESSMENT
[FreeTextEntry1] : 39 year old female s/p uniportal Right VATS, RML wedge resection x1, RMLobectomy on 4/15/19, pathology of RML revealed typical carcinoid, T1bNx. Additionally, nasopharynx biopsy revealed reactive lymphoid hyperplasia.  \par She was discharged home on 4/16/19 and presented to the ER on 4/17/19 after a syncopal episode - workup revealed an elevated D-dimer, however, she left AMA prior to CT Chest. She states that her syncopal episode was due to taking all of her medications at once and has had no similar episodes since. \par \par CXR 10/15/19:\par - small triangular density compatible with postop scarring/surgical sutures overlying the right hilum\par - clear lungs\par \par I have reviewed the patient's medical records and diagnostic images during the time of this office visit, and I have made the following recommendation:\par 1.  Follow up in 3 months with noncontrast Chest CT Scan.\par 2.  Discussed possible referral to Pain Management specialist, however patient wants to try Ibuprofen and does not want to see a pain management specialist at this time. \par \par Written by Germaine Peña NP, acting as a scribe for JULIO LI MD.\par \par The documentation recorded by the scribe accurately reflects the service I personally performed and the decisions made by me. JULIO LI MD\par

## 2020-01-13 NOTE — ED ADULT TRIAGE NOTE - BP NONINVASIVE DIASTOLIC (MM HG)
Patient:   ROSANNA MEIER JR            MRN: [id number]            FIN: [id number]              Age:   66 years     Sex:  MALE     :  [birth year]   Associated Diagnoses:   None   Author:   MITRA ALLEN     HOSPITALIST DISCHARGE SUMMARY  ADMISSION DATE: 2020                                            DISCHARGE DATE: 1/10/2020  Primary Care Physician    Physician Name:    Specialty :    No Consulting Physicians.  PROCEDURE HISTORY:  No qualifying data available.  HISTORY OF PRESENT ILLNESS: 67y/o male with no known past medical history presents with progressive exertional dyspnea for 1 month. At first, he felt that he was out of shape. Dyspnea has occured with progressively less exertion; this morning he was barely able to walk a few feet without symptoms which prompted him to come to the ED. Denies any chest pain, nausea, diaphoresis, lightheadedness, other symptoms. On arrival, patient was  tachycardic. Labs notable for troponin of 4.  HOSPITAL COURSE: Admitted to medicine with cardiology consult. Troponin peaked around 8. Patient underwent coronary angiogram which found multivessel disease including left main disease; cardiac surgery consulted and patient was planned for CABG 1/10. Overnight on , patient developed acute dyspnea, tachypnea; CXR noted pulmonary edema. Treated with BiPAP and lasix but did not improved. Transferred to ICU, intubated,  and required maximal  pressor support. Eventually patient had a PEA arrest with ROSC; however, due to maximal pressors and poor prognosis, family decided to not pursue further CPR. Patient passed away at 3:38AM on 1/10/2020  
53

## 2020-01-21 ENCOUNTER — APPOINTMENT (OUTPATIENT)
Dept: THORACIC SURGERY | Facility: CLINIC | Age: 40
End: 2020-01-21
Payer: COMMERCIAL

## 2020-01-21 VITALS
OXYGEN SATURATION: 96 % | TEMPERATURE: 98.2 F | RESPIRATION RATE: 17 BRPM | BODY MASS INDEX: 25.44 KG/M2 | HEART RATE: 89 BPM | HEIGHT: 64 IN | SYSTOLIC BLOOD PRESSURE: 102 MMHG | WEIGHT: 149 LBS | DIASTOLIC BLOOD PRESSURE: 72 MMHG

## 2020-01-21 PROCEDURE — 99214 OFFICE O/P EST MOD 30 MIN: CPT

## 2020-01-24 NOTE — HISTORY OF PRESENT ILLNESS
[FreeTextEntry1] : Shelby Cleary is a 39 year old female s/p uniportal Right VATS, RML wedge resection x1, RMLobectomy on 4/15/19, pathology of RML revealed typical carcinoid, T1bNx. Additionally, nasopharynx biopsy revealed reactive lymphoid hyperplasia. \par \par She is a current smoker (decreased to 1 PPD) with a history of bipolar disorder, lumpectomy for benign breast lesion, and cervical spine surgery (anterior-cervical discectomy). Lung mass was incidentally found during workup for breast lesion. \par \par She was discharged home on 4/16/19. She presented to the ER on 4/17/19 after a syncopal episode - workup revealed an elevated D-dimer, however, she left AMA prior to CT Chest. She states that her syncopal episode was due to taking all of her medications at once and has had no similar episodes since. \par \par Of note, patient had laser surgery of right leg for varicose vein on 01/17/2020 and scheduled for lump removal from spine on 02/26/2020. \par \par Chest chest on 01/18/2020:\par - post-op changes\par - mild emphysematous\par - no new or suspicious pulmonary nodule\par \par CXR 10/15/19:\par - small triangular density compatible with postop scarring/surgical sutures overlying the right hilum\par - clear lungs\par \par Chest CT on 7/8/19:\par - surgical sutures along the medial right major fissure and surgical clips within the right hilum\par - there is thickening of the right major and mild post-surgical scarring adjacent to the surgical sutures along the right major fissure.\par - no recurrent mass is identified within the right lung\par - the left lung is clear\par - No infiltrates, consolidations or endobronchial lesions are seen\par \par Patient is here today for a follow up. Patient c/o chronic SOB on exertion. Patient denies chest pain, cough, fever, chills, loss of appetite, weight loss, or hemoptysis.

## 2020-01-24 NOTE — DATA REVIEWED
[FreeTextEntry1] : Chest chest on 01/18/2020:\par - post-op changes\par - mild emphysematous\par - no new or suspicious pulmonary nodule

## 2020-01-24 NOTE — CONSULT LETTER
[Dear  ___] : Dear  [unfilled], [Consult Letter:] : I had the pleasure of evaluating your patient, [unfilled]. [( Thank you for referring [unfilled] for consultation for _____ )] : Thank you for referring [unfilled] for consultation for [unfilled] [Please see my note below.] : Please see my note below. [Sincerely,] : Sincerely, [Consult Closing:] : Thank you very much for allowing me to participate in the care of this patient.  If you have any questions, please do not hesitate to contact me. [DrTray  ___] : Dr. MEDRANO [FreeTextEntry3] : Santosh Vasquez MD, FACS \par Chief, Division of Thoracic Surgery \par Director, Minimally Invasive Thoracic Surgery \par Department of Cardiovascular and Thoracic Surgery \par [facility] \par , Cardiovascular and Thoracic Surgery\par \par  [FreeTextEntry2] : Dr. Robert Grimes (Pulm/Ref)\par Dr. Jonas Carmen (PCP)

## 2020-01-24 NOTE — ASSESSMENT
[FreeTextEntry1] : Shelby Cleary is a 39 year old female s/p uniportal Right VATS, RML wedge resection x1, RMLobectomy on 4/15/19, pathology of RML revealed typical carcinoid, T1bNx. Additionally, nasopharynx biopsy revealed reactive lymphoid hyperplasia. \par \par She is a current smoker (decreased to 1 PPD) with a history of bipolar disorder, lumpectomy for benign breast lesion, and cervical spine surgery (anterior-cervical discectomy). Lung mass was incidentally found during workup for breast lesion. \par \par She was discharged home on 4/16/19. She presented to the ER on 4/17/19 after a syncopal episode - workup revealed an elevated D-dimer, however, she left AMA prior to CT Chest. She states that her syncopal episode was due to taking all of her medications at once and has had no similar episodes since. \par \par Of note, patient had laser surgery of right leg for varicose vein on 01/17/2020 and scheduled for lump removal from spine on 02/26/2020. \par \par Chest chest on 01/18/2020:\par - post-op changes\par - mild emphysematous\par - no new or suspicious pulmonary nodule\par \par I have reviewed the patient's medical records and diagnostic images at time of this office consultation and have made the following recommendation:\par 1. CT scan showed no evidence of recurrence, recommended patient to return to office in 3 months with CXR\par 2. Smoking session reinforced, program information was provided. Patient verbalized understanding. \par \par I personally performed the services described in the documentation, reviewed the documentation recorded by the scribe in my presence and it accurately and completely records my words and actions.\par \par I, Gaby Magana, NP, am scribing for and the presence of DAYSI Corrales, the following sections HISTORY OF PRESENT ILLNESS, PAST MEDICAL/FAMILY/SOCIAL HISTORY; REVIEW OF SYSTEMS; VITAL SIGNS; PHYSICAL EXAM; DISPOSITION.

## 2020-01-24 NOTE — PHYSICAL EXAM
[Auscultation Breath Sounds / Voice Sounds] : lungs were clear to auscultation bilaterally [Heart Sounds] : normal S1 and S2 [Heart Rate And Rhythm] : heart rate was normal and rhythm regular [Heart Sounds Gallop] : no gallops [Murmurs] : no murmurs [Heart Sounds Pericardial Friction Rub] : no pericardial rub [Examination Of The Chest] : the chest was normal in appearance [Chest Visual Inspection Thoracic Asymmetry] : no chest asymmetry [Diminished Respiratory Excursion] : normal chest expansion [2+] : left 2+ [No Abnormalities] : the abdominal aorta was not enlarged and no bruit was heard [Bowel Sounds] : normal bowel sounds [Abdomen Soft] : soft [Abdomen Tenderness] : non-tender [Abdomen Mass (___ Cm)] : no abdominal mass palpated [Cervical Lymph Nodes Enlarged Posterior Bilaterally] : posterior cervical [Cervical Lymph Nodes Enlarged Anterior Bilaterally] : anterior cervical [Supraclavicular Lymph Nodes Enlarged Bilaterally] : supraclavicular [No CVA Tenderness] : no ~M costovertebral angle tenderness [No Spinal Tenderness] : no spinal tenderness [Abnormal Walk] : normal gait [Nail Clubbing] : no clubbing  or cyanosis of the fingernails [Musculoskeletal - Swelling] : no joint swelling seen [Motor Tone] : muscle strength and tone were normal [Skin Color & Pigmentation] : normal skin color and pigmentation [Skin Turgor] : normal skin turgor [] : no rash [Deep Tendon Reflexes (DTR)] : deep tendon reflexes were 2+ and symmetric [No Focal Deficits] : no focal deficits [Sensation] : the sensory exam was normal to light touch and pinprick [Oriented To Time, Place, And Person] : oriented to person, place, and time [Impaired Insight] : insight and judgment were intact [Affect] : the affect was normal [Right Carotid Bruit] : no bruit heard over the right carotid [Left Carotid Bruit] : no bruit heard over the left carotid [Right Femoral Bruit] : no bruit heard over the right femoral artery [Left Femoral Bruit] : no bruit heard over the left femoral artery

## 2020-03-03 ENCOUNTER — OUTPATIENT (OUTPATIENT)
Dept: OUTPATIENT SERVICES | Facility: HOSPITAL | Age: 40
LOS: 1 days | End: 2020-03-03
Payer: COMMERCIAL

## 2020-03-03 VITALS
HEART RATE: 74 BPM | HEIGHT: 64 IN | DIASTOLIC BLOOD PRESSURE: 68 MMHG | WEIGHT: 143.96 LBS | OXYGEN SATURATION: 98 % | RESPIRATION RATE: 14 BRPM | TEMPERATURE: 98 F | SYSTOLIC BLOOD PRESSURE: 96 MMHG

## 2020-03-03 DIAGNOSIS — Z98.890 OTHER SPECIFIED POSTPROCEDURAL STATES: Chronic | ICD-10-CM

## 2020-03-03 DIAGNOSIS — L72.3 SEBACEOUS CYST: ICD-10-CM

## 2020-03-03 LAB
ANION GAP SERPL CALC-SCNC: 11 MMO/L — SIGNIFICANT CHANGE UP (ref 7–14)
BUN SERPL-MCNC: 9 MG/DL — SIGNIFICANT CHANGE UP (ref 7–23)
CALCIUM SERPL-MCNC: 9.4 MG/DL — SIGNIFICANT CHANGE UP (ref 8.4–10.5)
CHLORIDE SERPL-SCNC: 102 MMOL/L — SIGNIFICANT CHANGE UP (ref 98–107)
CO2 SERPL-SCNC: 25 MMOL/L — SIGNIFICANT CHANGE UP (ref 22–31)
CREAT SERPL-MCNC: 0.79 MG/DL — SIGNIFICANT CHANGE UP (ref 0.5–1.3)
GLUCOSE SERPL-MCNC: 99 MG/DL — SIGNIFICANT CHANGE UP (ref 70–99)
HCT VFR BLD CALC: 41.2 % — SIGNIFICANT CHANGE UP (ref 34.5–45)
HGB BLD-MCNC: 13.4 G/DL — SIGNIFICANT CHANGE UP (ref 11.5–15.5)
MCHC RBC-ENTMCNC: 29.4 PG — SIGNIFICANT CHANGE UP (ref 27–34)
MCHC RBC-ENTMCNC: 32.5 % — SIGNIFICANT CHANGE UP (ref 32–36)
MCV RBC AUTO: 90.4 FL — SIGNIFICANT CHANGE UP (ref 80–100)
NRBC # FLD: 0 K/UL — SIGNIFICANT CHANGE UP (ref 0–0)
PLATELET # BLD AUTO: 316 K/UL — SIGNIFICANT CHANGE UP (ref 150–400)
PMV BLD: 9.5 FL — SIGNIFICANT CHANGE UP (ref 7–13)
POTASSIUM SERPL-MCNC: 3.9 MMOL/L — SIGNIFICANT CHANGE UP (ref 3.5–5.3)
POTASSIUM SERPL-SCNC: 3.9 MMOL/L — SIGNIFICANT CHANGE UP (ref 3.5–5.3)
RBC # BLD: 4.56 M/UL — SIGNIFICANT CHANGE UP (ref 3.8–5.2)
RBC # FLD: 13.2 % — SIGNIFICANT CHANGE UP (ref 10.3–14.5)
SODIUM SERPL-SCNC: 138 MMOL/L — SIGNIFICANT CHANGE UP (ref 135–145)
WBC # BLD: 8.33 K/UL — SIGNIFICANT CHANGE UP (ref 3.8–10.5)
WBC # FLD AUTO: 8.33 K/UL — SIGNIFICANT CHANGE UP (ref 3.8–10.5)

## 2020-03-03 PROCEDURE — 93010 ELECTROCARDIOGRAM REPORT: CPT

## 2020-03-03 RX ORDER — LURASIDONE HYDROCHLORIDE 40 MG/1
1 TABLET ORAL
Qty: 0 | Refills: 0 | DISCHARGE

## 2020-03-03 RX ORDER — IBUPROFEN 200 MG
1 TABLET ORAL
Qty: 0 | Refills: 0 | DISCHARGE

## 2020-03-03 RX ORDER — CLONAZEPAM 1 MG
1 TABLET ORAL
Qty: 0 | Refills: 0 | DISCHARGE

## 2020-03-03 RX ORDER — TRAZODONE HCL 50 MG
1 TABLET ORAL
Qty: 0 | Refills: 0 | DISCHARGE

## 2020-03-03 RX ORDER — ACETAMINOPHEN 500 MG
2 TABLET ORAL
Qty: 0 | Refills: 0 | DISCHARGE

## 2020-03-03 NOTE — H&P PST ADULT - NSICDXPROBLEM_GEN_ALL_CORE_FT
PROBLEM DIAGNOSES  Problem: Sebaceous cyst  Assessment and Plan: Pt scheduled for excision sebaceous cyst on 3/11/2020.  labs done results pending, ekg done.  Urine cup, hibiclens provided:  verbal and written instructions given with teach back, pt able to verbalize understanding.  Preop teaching done, pt able to verbalize understanding.

## 2020-03-03 NOTE — H&P PST ADULT - NEGATIVE CARDIOVASCULAR SYMPTOMS
no chest pain/no palpitations/no dyspnea on exertion/no paroxysmal nocturnal dyspnea/no claudication/no orthopnea/no peripheral edema

## 2020-03-03 NOTE — H&P PST ADULT - NEGATIVE GENERAL SYMPTOMS
no weight gain/no polyphagia/no polydipsia/no sweating/no anorexia/no polyuria/no malaise/no fatigue

## 2020-03-03 NOTE — H&P PST ADULT - VENOUS THROMBOEMBOLISM CURRENT STATUS
(2) malignancy (present or previous) (1) other risk factor (includes escalating BMI, pack-years of smoking, diabetes requiring insulin, chemotherapy, female gender and length of surgery)/(2) malignancy (present or previous)
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
## 2020-03-03 NOTE — H&P PST ADULT - GASTROINTESTINAL DETAILS
no guarding/nontender/bowel sounds normal/no bruit/no rigidity/no rebound tenderness/no organomegaly/no distention/soft/no masses palpable

## 2020-03-03 NOTE — H&P PST ADULT - NSICDXPASTSURGICALHX_GEN_ALL_CORE_FT
PAST SURGICAL HISTORY:  H/O neck surgery      (normal spontaneous vaginal delivery) x2    S/P breast biopsy, left PAST SURGICAL HISTORY:  History of lung surgery right vats RML lobectomy 4/2019    S/P cervical discectomy 2019    S/P lumpectomy of breast left 2018

## 2020-03-03 NOTE — H&P PST ADULT - NEGATIVE GENERAL GENITOURINARY SYMPTOMS
no dysuria/no hematuria/no flank pain L/no flank pain R/no bladder infections/normal urinary frequency

## 2020-03-03 NOTE — H&P PST ADULT - RS GEN PE MLT RESP DETAILS PC
breath sounds equal/no chest wall tenderness/respirations non-labored/no intercostal retractions/no rales/no rhonchi/no wheezes/clear to auscultation bilaterally/good air movement

## 2020-03-03 NOTE — H&P PST ADULT - HISTORY OF PRESENT ILLNESS
41y/o female scheduled for excision sebaceous cyst on back.  Pt states, "has cyst since childhood, 1 month ago developed discomfort, I&D now having removed."

## 2020-04-21 ENCOUNTER — APPOINTMENT (OUTPATIENT)
Dept: THORACIC SURGERY | Facility: CLINIC | Age: 40
End: 2020-04-21

## 2020-07-02 NOTE — ASU PREOP CHECKLIST - TYPE OF SOLUTION
Quality 431: Preventive Care And Screening: Unhealthy Alcohol Use - Screening: Patient screened for unhealthy alcohol use using a single question and scores 2 or greater episodes per year and brief intervention did not occur Quality 110: Preventive Care And Screening: Influenza Immunization: Influenza Immunization Administered during Influenza season Quality 226: Preventive Care And Screening: Tobacco Use: Screening And Cessation Intervention: Patient screened for tobacco use and is an ex/non-smoker Detail Level: Detailed Quality 130: Documentation Of Current Medications In The Medical Record: Eligible clinician attests to documenting in the medical record the patient is not eligible for a current list of medications being obtained, updated, or reviewed by the eligible clinician LR

## 2020-08-10 PROBLEM — L72.3 SEBACEOUS CYST: Chronic | Status: ACTIVE | Noted: 2020-03-03

## 2020-08-10 PROBLEM — C80.1 MALIGNANT (PRIMARY) NEOPLASM, UNSPECIFIED: Chronic | Status: ACTIVE | Noted: 2020-03-03

## 2020-08-18 ENCOUNTER — APPOINTMENT (OUTPATIENT)
Dept: THORACIC SURGERY | Facility: CLINIC | Age: 40
End: 2020-08-18
Payer: COMMERCIAL

## 2020-08-18 VITALS
DIASTOLIC BLOOD PRESSURE: 77 MMHG | SYSTOLIC BLOOD PRESSURE: 115 MMHG | OXYGEN SATURATION: 99 % | HEART RATE: 83 BPM | TEMPERATURE: 99.3 F | RESPIRATION RATE: 17 BRPM

## 2020-08-18 PROCEDURE — 99214 OFFICE O/P EST MOD 30 MIN: CPT

## 2020-08-19 NOTE — PHYSICAL EXAM
[Auscultation Breath Sounds / Voice Sounds] : lungs were clear to auscultation bilaterally [Heart Rate And Rhythm] : heart rate was normal and rhythm regular [Heart Sounds] : normal S1 and S2 [Heart Sounds Gallop] : no gallops [Murmurs] : no murmurs [Heart Sounds Pericardial Friction Rub] : no pericardial rub [Examination Of The Chest] : the chest was normal in appearance [Chest Visual Inspection Thoracic Asymmetry] : no chest asymmetry [Diminished Respiratory Excursion] : normal chest expansion [No CVA Tenderness] : no ~M costovertebral angle tenderness [No Spinal Tenderness] : no spinal tenderness [Abnormal Walk] : normal gait [Musculoskeletal - Swelling] : no joint swelling seen [Nail Clubbing] : no clubbing  or cyanosis of the fingernails [Motor Tone] : muscle strength and tone were normal [Skin Color & Pigmentation] : normal skin color and pigmentation [Skin Turgor] : normal skin turgor [] : no rash [Impaired Insight] : insight and judgment were intact [Oriented To Time, Place, And Person] : oriented to person, place, and time [Affect] : the affect was normal

## 2020-08-20 NOTE — HISTORY OF PRESENT ILLNESS
[FreeTextEntry1] : Shelby Cleary is a 40 year old female s/p uniportal Right VATS, RML wedge resection x1, RMLobectomy on 4/15/19, pathology of RML revealed typical carcinoid, T1bNx. Additionally, nasopharynx biopsy revealed reactive lymphoid hyperplasia. \par \par She is a current smoker (2 PPD) with a history of bipolar disorder, lumpectomy for benign breast lesion, and cervical spine surgery (anterior-cervical discectomy). Lung mass was incidentally found during workup for breast lesion. \par \par She was discharged home on 4/16/19. She presented to the ER on 4/17/19 after a syncopal episode - workup revealed an elevated D-dimer, however, she left AMA prior to CT Chest. She states that her syncopal episode was due to taking all of her medications at once and has had no similar episodes since. \par \par Of note, patient had laser surgery of right leg for varicose vein on 01/17/2020 and lump removal from spine on 02/26/2020. \par \par CXR on 08/14/2020: \par - clear lungs \par - no significant interval change\par \par Chest chest on 01/18/2020:\par - post-op changes\par - mild emphysematous\par - no new or suspicious pulmonary nodule\par \par CXR 10/15/19:\par - small triangular density compatible with postop scarring/surgical sutures overlying the right hilum\par - clear lungs\par \par Chest CT on 7/8/19:\par - surgical sutures along the medial right major fissure and surgical clips within the right hilum\par - there is thickening of the right major and mild post-surgical scarring adjacent to the surgical sutures along the right major fissure.\par - no recurrent mass is identified within the right lung\par - the left lung is clear\par - No infiltrates, consolidations or endobronchial lesions are seen\par \par Patient is here today for a follow up. Patient c/o SOB on exertion, dry cough, denies chest pain, fever, chills, loss of appetite, weight loss, or hemoptysis.

## 2020-08-20 NOTE — ASSESSMENT
[FreeTextEntry1] : Shelby Cleary is a 40 year old female s/p uniportal Right VATS, RML wedge resection x1, RMLobectomy on 4/15/19, pathology of RML revealed typical carcinoid, T1bNx. Additionally, nasopharynx biopsy revealed reactive lymphoid hyperplasia. \par \par She is a current smoker (2 PPD) with a history of bipolar disorder, lumpectomy for benign breast lesion, and cervical spine surgery (anterior-cervical discectomy). Lung mass was incidentally found during workup for breast lesion. \par \par She was discharged home on 4/16/19. She presented to the ER on 4/17/19 after a syncopal episode - workup revealed an elevated D-dimer, however, she left AMA prior to CT Chest. She states that her syncopal episode was due to taking all of her medications at once and has had no similar episodes since. \par \par Of note, patient had laser surgery of right leg for varicose vein on 01/17/2020 and lump removal from spine on 02/26/2020. \par \par CXR on 08/14/2020: \par - clear lungs \par - no significant interval change\par \par I have reviewed the patient's medical records and diagnostic images at time of this office consultation and have made the following recommendation:\par 1. CXR reviewed and explained to patient, I recommended patient to return to office in 6 months with CT Chest without contrast.\par 2.  Smoking session reinforced. \par \par I personally performed the services described in the documentation, reviewed the documentation recorded by the scribe in my presence and it accurately and completely records my words and actions.\par \par I, Gaby Magana NP, am scribing for and the presence of DAYSI Corrales, the following sections HISTORY OF PRESENT ILLNESS, PAST MEDICAL/FAMILY/SOCIAL HISTORY; REVIEW OF SYSTEMS; VITAL SIGNS; PHYSICAL EXAM; DISPOSITION.

## 2020-08-20 NOTE — CONSULT LETTER
[Dear  ___] : Dear  [unfilled], [Consult Letter:] : I had the pleasure of evaluating your patient, [unfilled]. [( Thank you for referring [unfilled] for consultation for _____ )] : Thank you for referring [unfilled] for consultation for [unfilled] [Please see my note below.] : Please see my note below. [Consult Closing:] : Thank you very much for allowing me to participate in the care of this patient.  If you have any questions, please do not hesitate to contact me. [Sincerely,] : Sincerely, [FreeTextEntry2] : Dr. Robert Grimes (Pulm/Ref)\par Dr. Jonas Carmen (PCP)  [FreeTextEntry3] : Santosh Vasquez MD, FACS \par Chief, Division of Thoracic Surgery \par Director, Minimally Invasive Thoracic Surgery \par Department of Cardiovascular and Thoracic Surgery \par [facility] \par , Cardiovascular and Thoracic Surgery\par \par

## 2020-10-08 ENCOUNTER — APPOINTMENT (OUTPATIENT)
Dept: SPINE | Facility: CLINIC | Age: 40
End: 2020-10-08
Payer: COMMERCIAL

## 2020-10-08 VITALS
OXYGEN SATURATION: 95 % | WEIGHT: 134 LBS | DIASTOLIC BLOOD PRESSURE: 82 MMHG | SYSTOLIC BLOOD PRESSURE: 114 MMHG | HEART RATE: 80 BPM | BODY MASS INDEX: 22.88 KG/M2 | HEIGHT: 64 IN

## 2020-10-08 PROCEDURE — 99212 OFFICE O/P EST SF 10 MIN: CPT

## 2020-10-08 RX ORDER — LURASIDONE HYDROCHLORIDE 20 MG/1
20 TABLET, FILM COATED ORAL
Refills: 0 | Status: DISCONTINUED | COMMUNITY
End: 2020-10-08

## 2020-10-08 RX ORDER — ESCITALOPRAM OXALATE 20 MG/1
20 TABLET, FILM COATED ORAL
Refills: 0 | Status: DISCONTINUED | COMMUNITY
End: 2020-10-08

## 2020-10-08 RX ORDER — PAROXETINE HYDROCHLORIDE 20 MG/1
20 TABLET, FILM COATED ORAL
Qty: 30 | Refills: 0 | Status: DISCONTINUED | COMMUNITY
Start: 2019-04-05 | End: 2020-10-08

## 2020-10-08 NOTE — REVIEW OF SYSTEMS
[Numbness] : numbness [Tingling] : tingling [Negative] : Heme/Lymph [de-identified] : right arm pain neck pressure

## 2020-10-08 NOTE — ASSESSMENT
[FreeTextEntry1] : Stable appearance of cervical fusion hardware and arthrodesis.  She will have a MRI of the cervical spine and she will return once these images are complete.    A medrol dose dwayne and omeprazole were ordered fro the pain.

## 2020-10-08 NOTE — REASON FOR VISIT
[Follow-Up: _____] : a [unfilled] follow-up visit [Other: _____] : [unfilled] [FreeTextEntry1] : 40 year old female with recurrent cervical radiculopathy .  She is now two years post op  C 5 C6 , C 6 C7 and had done well until two weeks ago. She was pain free until she picked her 170 pound son up from a fall and began to experience right arm pain into he hand and all fingers with associated tingling  and numbness rated at  9/10.  She has shoulder pain and pressure posteriorly of her neck.   She has trouble with right hand coordination.   No new onset of balance issues.  Right leg weakness.  Cervical xrays show no hardware failure and slid fusion across the fused segment.

## 2020-10-08 NOTE — PHYSICAL EXAM
[Sensation Tactile Decrease] : light touch was intact [Abnormal Walk] : normal gait [3+] : Triceps right 3+ [2+] : Ankle jerk left 2+ [Dowell] : Dowell's sign was demonstrated [FreeTextEntry6] : mild right  weakness 4/5

## 2020-10-22 ENCOUNTER — APPOINTMENT (OUTPATIENT)
Dept: SPINE | Facility: CLINIC | Age: 40
End: 2020-10-22
Payer: COMMERCIAL

## 2020-10-22 VITALS
HEIGHT: 64 IN | HEART RATE: 96 BPM | DIASTOLIC BLOOD PRESSURE: 76 MMHG | WEIGHT: 134 LBS | TEMPERATURE: 98.1 F | OXYGEN SATURATION: 95 % | BODY MASS INDEX: 22.88 KG/M2 | SYSTOLIC BLOOD PRESSURE: 99 MMHG

## 2020-10-22 PROCEDURE — 99072 ADDL SUPL MATRL&STAF TM PHE: CPT

## 2020-10-22 PROCEDURE — 99212 OFFICE O/P EST SF 10 MIN: CPT

## 2020-10-23 NOTE — ASSESSMENT
[FreeTextEntry1] : 40 year old female who lifted a heavy person and has had cervical radiculopathy progressing into both UE.   Mild stenosis of C 4-5 on MRI imaging of cervical spine.  Normal Neurological examination.  Non surgical measures were discussed.    An  UE EMG was ordered by Dr Loyola .   She will return in two weeks for reevaluation and to assess the EMG results.  She will attend six to eight weeks of PT.   If surgery is considered risks and benefits were explained.  She will try Salonpas to her shoulder area

## 2020-10-23 NOTE — PHYSICAL EXAM
[Motor Strength] : muscle strength was normal in all four extremities [Abnormal Walk] : normal gait [2+] : Ankle jerk left 2+ [Dowell] : Dowell's sign was not demonstrated [L'Hermitte's] : neck flexion did not produce tingling down the spine/arms [Spurling's - Opposite Side] : Negative Spurling's on opposite side [Spurling's Same Side] : Negative Spurling's on same side

## 2020-10-23 NOTE — REVIEW OF SYSTEMS
[Hand Weakness] :  hand weakness [Numbness] : numbness [Negative] : Heme/Lymph [de-identified] : neck , shoulder and bilateral arms

## 2020-10-23 NOTE — REASON FOR VISIT
[Follow-Up: _____] : a [unfilled] follow-up visit [Other: _____] : [unfilled] [FreeTextEntry1] : 40 year old female with a injury in early October 2002 where she picked up her 170 pound son.  She had an ACDF in 2018 and had been pain free until this event.  She no w has unrelenting arm radiculopathy that has progressed since October 8, 2020 .  The pain is now bilateral arm radicular pain.  She admits to weakness and difficulty with coordination.  She denies any gait imbalance and no urine incontinence.    She had no relief form a medrol dose dwayne. A new MRI scan shows junctional stenosis at C4-5 with slight retrolisthesis.

## 2020-11-09 ENCOUNTER — APPOINTMENT (OUTPATIENT)
Dept: SPINE | Facility: CLINIC | Age: 40
End: 2020-11-09
Payer: COMMERCIAL

## 2020-11-09 VITALS
DIASTOLIC BLOOD PRESSURE: 100 MMHG | SYSTOLIC BLOOD PRESSURE: 139 MMHG | OXYGEN SATURATION: 98 % | TEMPERATURE: 97 F | HEART RATE: 90 BPM | RESPIRATION RATE: 18 BRPM

## 2020-11-09 DIAGNOSIS — D32.0 BENIGN NEOPLASM OF CEREBRAL MENINGES: ICD-10-CM

## 2020-11-09 PROCEDURE — 99212 OFFICE O/P EST SF 10 MIN: CPT

## 2020-11-09 PROCEDURE — 99072 ADDL SUPL MATRL&STAF TM PHE: CPT

## 2020-11-09 NOTE — REASON FOR VISIT
[Follow-Up: _____] : a [unfilled] follow-up visit [FreeTextEntry1] : NEck PAin \par S/P ACDF 2018\par A recent MRI scan which was followed up with a contrast-enhanced MRI scan shows findings that are essentially unchanged compared with MRI scan done in April of 2019. There is a small planum sphenoidale dural enhancing lesion consistent with a small meningioma.  There is another small right lateral intraventricular lesion which does not enhance and is essentially unchanged once again compared with MRI scan from April 2019.

## 2020-11-09 NOTE — ASSESSMENT
[FreeTextEntry1] : We'll refer to pain management. No indication for further cervical spine intervention at the present time. Recommend followup MRI of the brain with and without contrast in one year in order to follow small incidental lesions.

## 2020-11-09 NOTE — PHYSICAL EXAM
[Cranial Nerves Optic (II)] : visual acuity intact bilaterally,  pupils equal round and reactive to light [Cranial Nerves Oculomotor (III)] : extraocular motion intact [Cranial Nerves Trigeminal (V)] : facial sensation intact symmetrically [Cranial Nerves Facial (VII)] : face symmetrical [Cranial Nerves Vestibulocochlear (VIII)] : hearing was intact bilaterally [Cranial Nerves Glossopharyngeal (IX)] : tongue and palate midline [Cranial Nerves Accessory (XI - Cranial And Spinal)] : head turning and shoulder shrug symmetric [Cranial Nerves Hypoglossal (XII)] : there was no tongue deviation with protrusion [] : no respiratory distress [Abnormal Walk] : normal gait

## 2021-01-06 NOTE — HISTORY OF PRESENT ILLNESS
[FreeTextEntry1] : Shelby Cleary is a 39 year old female s/p uniportal Right VATS, RML wedge resection x1, RMLobectomy on 4/15/19, pathology of RML revealed typical carcinoid, T1bNx. Additionally, nasopharynx biopsy revealed reactive lymphoid hyperplasia. \par \par She is a current smoker (2 PPD x 27 years) with a history of bipolar disorder, lumpectomy for benign breast lesion, and cervical spine surgery (anterior-cervical discectomy). Lung mass was incidentally found during workup for breast lesion. \par \par She was discharged home on 4/16/19. She presented to the ER on 4/17/19 after a syncopal episode - workup revealed an elevated D-dimer, however, she left AMA prior to CT Chest. She states that her syncopal episode was due to taking all of her medications at once and has had no similar episodes since. \par \par CXR 10/15/19:\par - small triangular density compatible with postop scarring/surgical sutures overlying the right hilum\par - clear lungs\par \par Chest CT on 7/8/19:\par - surgical sutures along the medial right major fissure and surgical clips within the right hilum\par - there is thickening of the right major and mild post-surgical scarring adjacent to the surgical sutures along the right major fissure.\par - no recurrent mass is identified within the right lung\par - the left lung is clear\par - No infiltrates, consolidations or endobronchial lesions are seen\par \par She returns today with complaints of right VATS discomfort which she describes as sharp as time and reports that the area is still sensitive.  She is not taking any pain medication for it at this time, but has Ibuprofen 600 mg which she will start to manage discomfort.  She also admits to shortness of breath with exertion, but she denies any fever, chills, cough, chest pain, hemoptysis, or recent illness. \par 
no

## 2021-02-23 ENCOUNTER — APPOINTMENT (OUTPATIENT)
Dept: THORACIC SURGERY | Facility: CLINIC | Age: 41
End: 2021-02-23
Payer: COMMERCIAL

## 2021-02-23 VITALS
SYSTOLIC BLOOD PRESSURE: 90 MMHG | HEIGHT: 64 IN | BODY MASS INDEX: 24.24 KG/M2 | WEIGHT: 142 LBS | HEART RATE: 84 BPM | DIASTOLIC BLOOD PRESSURE: 61 MMHG | OXYGEN SATURATION: 97 % | TEMPERATURE: 98.1 F

## 2021-02-23 PROCEDURE — 99214 OFFICE O/P EST MOD 30 MIN: CPT

## 2021-02-23 PROCEDURE — 99072 ADDL SUPL MATRL&STAF TM PHE: CPT

## 2021-02-26 NOTE — HISTORY OF PRESENT ILLNESS
[FreeTextEntry1] : Shelby Cleary is a 40 year old female s/p uniportal Right VATS, RML wedge resection x1, RMLobectomy on 4/15/19, pathology of RML revealed typical carcinoid, T1bNx. Additionally, nasopharynx biopsy revealed reactive lymphoid hyperplasia. \par \par She is a current smoker (1/2 PPD) with a history of bipolar disorder, lumpectomy for benign breast lesion, and cervical spine surgery (anterior-cervical discectomy). Lung mass was incidentally found during workup for breast lesion. \par \par She was discharged home on 4/16/19. She presented to the ER on 4/17/19 after a syncopal episode - workup revealed an elevated D-dimer, however, she left AMA prior to CT Chest. She states that her syncopal episode was due to taking all of her medications at once and has had no similar episodes since. \par \par Of note, patient had laser surgery of right leg for varicose vein on 01/17/2020 and lump removal from spine on 02/26/2020. \par \par CXR on 08/14/2020: \par - clear lungs \par - no significant interval change\par \par Chest chest on 01/18/2020:\par - post-op changes\par - mild emphysematous\par - no new or suspicious pulmonary nodule\par \par CXR 10/15/19:\par - small triangular density compatible with postop scarring/surgical sutures overlying the right hilum\par - clear lungs\par \par Chest CT on 7/8/19:\par - surgical sutures along the medial right major fissure and surgical clips within the right hilum\par - there is thickening of the right major and mild post-surgical scarring adjacent to the surgical sutures along the right major fissure.\par - no recurrent mass is identified within the right lung\par - the left lung is clear\par - No infiltrates, consolidations or endobronchial lesions are seen\par \par CT chest on 2/16/21:\par - Post op changes\par - 3 mm noncalcified subpleural nodular density at the later right lung apex, which is stable\par - Small areas of ill-defined ggo centrally within the RUL\par \par Patient presents to office for follow up. Patient is currently smoking 1/2 PPD and endorses worsening shortness of breath over the past month. Patient denies chest pain, cough, hemoptysis, fever, chills, night sweats, lightheadedness or dizziness.\par

## 2021-02-26 NOTE — CONSULT LETTER
[Dear  ___] : Dear  [unfilled], [Courtesy Letter:] : I had the pleasure of seeing your patient, [unfilled], in my office today. [Consult Closing:] : Thank you very much for allowing me to participate in the care of this patient.  If you have any questions, please do not hesitate to contact me. [Please see my note below.] : Please see my note below. [Sincerely,] : Sincerely, [FreeTextEntry2] : Dr. Robert Grimes (Pulm/Ref)\par Dr. Jonas Carmen (PCP) \par  [FreeTextEntry3] : Santosh Vasquez MD, FACS \par Chief, Division of Thoracic Surgery \par Director, Minimally Invasive Thoracic Surgery \par Department of Cardiovascular and Thoracic Surgery \par [facility] \par , Cardiovascular and Thoracic Surgery\par \par \par

## 2021-02-26 NOTE — PHYSICAL EXAM
[Sclera] : the sclera and conjunctiva were normal [PERRL With Normal Accommodation] : pupils were equal in size, round, and reactive to light [Extraocular Movements] : extraocular movements were intact [Respiration, Rhythm And Depth] : normal respiratory rhythm and effort [Exaggerated Use Of Accessory Muscles For Inspiration] : no accessory muscle use [Auscultation Breath Sounds / Voice Sounds] : lungs were clear to auscultation bilaterally [Heart Rate And Rhythm] : heart rate was normal and rhythm regular [Examination Of The Chest] : the chest was normal in appearance [Chest Visual Inspection Thoracic Asymmetry] : no chest asymmetry [Diminished Respiratory Excursion] : normal chest expansion [2+] : left 2+ [Breast Appearance] : normal in appearance [Breast Palpation Mass] : no palpable masses [Bowel Sounds] : normal bowel sounds [Abdomen Soft] : soft [Abdomen Tenderness] : non-tender [Cervical Lymph Nodes Enlarged Posterior Bilaterally] : posterior cervical [Cervical Lymph Nodes Enlarged Anterior Bilaterally] : anterior cervical [Supraclavicular Lymph Nodes Enlarged Bilaterally] : supraclavicular [No CVA Tenderness] : no ~M costovertebral angle tenderness [No Spinal Tenderness] : no spinal tenderness [Abnormal Walk] : normal gait [Nail Clubbing] : no clubbing  or cyanosis of the fingernails [Involuntary Movements] : no involuntary movements were seen [Musculoskeletal - Swelling] : no joint swelling seen [Motor Tone] : muscle strength and tone were normal [Skin Color & Pigmentation] : normal skin color and pigmentation [Skin Turgor] : normal skin turgor [] : no rash [Skin Lesions] : no skin lesions [Deep Tendon Reflexes (DTR)] : deep tendon reflexes were 2+ and symmetric [Sensation] : the sensory exam was normal to light touch and pinprick [Motor Exam] : the motor exam was normal [No Focal Deficits] : no focal deficits [Oriented To Time, Place, And Person] : oriented to person, place, and time [Impaired Insight] : insight and judgment were intact [Affect] : the affect was normal [Mood] : the mood was normal [Memory Recent] : recent memory was not impaired [Memory Remote] : remote memory was not impaired

## 2021-02-26 NOTE — DATA REVIEWED
[FreeTextEntry1] : CT chest on 2/16/21:\par - Post op changes\par - 3 mm noncalcified subpleural nodular density at the later right lung apex, which is stable\par - Small areas of ill-defined ggo centrally within the RUL\par

## 2021-02-26 NOTE — ASSESSMENT
[FreeTextEntry1] : Shelby Cleary is a 40 year old female, current smoker (1/2 PPD) with a history of bipolar disorder, lumpectomy for benign breast lesion, and cervical spine surgery (anterior-cervical discectomy). Lung mass incidentally found during workup for breast lesion. \par \par  s/p uniportal Right VATS, RML wedge resection x1, RMLobectomy on 4/15/19, pathology of RML revealed typical carcinoid, T1bNx. Additionally, nasopharynx biopsy revealed reactive lymphoid hyperplasia. \par \par She was discharged home on 4/16/19. She presented to the ER on 4/17/19 after a syncopal episode - workup revealed an elevated D-dimer, however, she left AMA prior to CT Chest. She states that her syncopal episode was due to taking all of her medications at once and has had no similar episodes since. \par \par CT chest on 2/16/21:\par - Post op changes\par - 3 mm noncalcified subpleural nodular density at the later right lung apex, which is stable\par - Small areas of ill-defined ggo centrally within the RUL\par \par I have reviewed the medical records and imaging with the patient at the time of this office consultation, and I've made the following recommendations:\par 1) Pt with worsening SOB; Follow up with Dr. Grimes for workup. She verbalizes understanding\par 2) Nodules stable; RTC in 6 months with follow up CT chest, no contrast. \par \par I personally performed the services described in the documentation, reviewed the documentation recorded by the scribe in my presence and it accurately and completely records my words and actions.\par \par I, Dianna Arzola, ANP-C, am scribing for and the presence of DAYSI Corrales, the following sections HISTORY OF PRESENT ILLNESS, PAST MEDICAL/FAMILY/SOCIAL HISTORY; REVIEW OF SYSTEMS; VITAL SIGNS; PHYSICAL EXAM; DISPOSITION.\par \par \par

## 2021-03-02 ENCOUNTER — APPOINTMENT (OUTPATIENT)
Dept: PULMONOLOGY | Facility: CLINIC | Age: 41
End: 2021-03-02
Payer: COMMERCIAL

## 2021-03-02 VITALS
HEIGHT: 63 IN | WEIGHT: 138 LBS | DIASTOLIC BLOOD PRESSURE: 83 MMHG | HEART RATE: 114 BPM | TEMPERATURE: 98.6 F | BODY MASS INDEX: 24.45 KG/M2 | SYSTOLIC BLOOD PRESSURE: 123 MMHG | OXYGEN SATURATION: 97 %

## 2021-03-02 DIAGNOSIS — R91.1 SOLITARY PULMONARY NODULE: ICD-10-CM

## 2021-03-02 DIAGNOSIS — R91.8 OTHER NONSPECIFIC ABNORMAL FINDING OF LUNG FIELD: ICD-10-CM

## 2021-03-02 DIAGNOSIS — R06.2 WHEEZING: ICD-10-CM

## 2021-03-02 DIAGNOSIS — Z20.822 CONTACT WITH AND (SUSPECTED) EXPOSURE TO COVID-19: ICD-10-CM

## 2021-03-02 DIAGNOSIS — R06.02 SHORTNESS OF BREATH: ICD-10-CM

## 2021-03-02 DIAGNOSIS — R76.8 OTHER SPECIFIED ABNORMAL IMMUNOLOGICAL FINDINGS IN SERUM: ICD-10-CM

## 2021-03-02 PROCEDURE — 99406 BEHAV CHNG SMOKING 3-10 MIN: CPT

## 2021-03-02 PROCEDURE — 99072 ADDL SUPL MATRL&STAF TM PHE: CPT

## 2021-03-02 PROCEDURE — 99214 OFFICE O/P EST MOD 30 MIN: CPT | Mod: 25

## 2021-03-02 RX ORDER — ALBUTEROL SULFATE 90 UG/1
108 (90 BASE) INHALANT RESPIRATORY (INHALATION)
Qty: 1 | Refills: 3 | Status: ACTIVE | COMMUNITY
Start: 2021-03-02 | End: 1900-01-01

## 2021-03-02 RX ORDER — METHYLPREDNISOLONE 4 MG/1
4 TABLET ORAL
Qty: 1 | Refills: 0 | Status: DISCONTINUED | COMMUNITY
Start: 2020-10-08 | End: 2021-03-02

## 2021-03-02 NOTE — HISTORY OF PRESENT ILLNESS
[Current] : current [Stable] : are stable [None] : The patient is currently asymptomatic [TextBox_11] : 1 [FreeTextEntry2] : 2 packs per day [FreeTextEntry1] : Thoracic surgery noted\par Chief complaint a.m. wheeze\par Abnormal chest CT scan reviewed\par Post typical carcinoid April 2019\par Dyspnea on exertion\par No history of COVID-19 infection\par Prior history of alpha-1 antitrypsin titer negative\par \par \par Shelby Cleary is a 40 year old female s/p uniportal Right VATS, RML wedge resection x1, RMLobectomy on 4/15/19, pathology of RML revealed typical carcinoid, T1bNx. Additionally, nasopharynx biopsy revealed reactive lymphoid hyperplasia. \par \par She is a current smoker (1/2 PPD) with a history of bipolar disorder, lumpectomy for benign breast lesion, and cervical spine surgery (anterior-cervical discectomy). Lung mass was incidentally found during workup for breast lesion. \par \par She was discharged home on 4/16/19. She presented to the ER on 4/17/19 after a syncopal episode - workup revealed an elevated D-dimer, however, she left AMA prior to CT Chest. She states that her syncopal episode was due to taking all of her medications at once and has had no similar episodes since. \par \par Of note, patient had laser surgery of right leg for varicose vein on 01/17/2020 and lump removal from spine on 02/26/2020. \par \par CXR on 08/14/2020: \par - clear lungs \par - no significant interval change\par \par Chest chest on 01/18/2020:\par - post-op changes\par - mild emphysematous\par - no new or suspicious pulmonary nodule\par \par CXR 10/15/19:\par - small triangular density compatible with postop scarring/surgical sutures overlying the right hilum\par - clear lungs\par \par Chest CT on 7/8/19:\par - surgical sutures along the medial right major fissure and surgical clips within the right hilum\par - there is thickening of the right major and mild post-surgical scarring adjacent to the surgical sutures along the right major fissure.\par - no recurrent mass is identified within the right lung\par - the left lung is clear\par - No infiltrates, consolidations or endobronchial lesions are seen\par \par CT chest on 2/16/21:\par - Post op changes\par - 3 mm noncalcified subpleural nodular density at the later right lung apex, which is stable\par - Small areas of ill-defined ggo centrally within the RUL\par \par Patient presents to office for follow up. Patient is currently smoking 1 PPD and endorses worsening shortness of breath over the past month. Patient denies chest pain, cough, hemoptysis, fever, chills, night sweats, lightheadedness or dizziness.

## 2021-03-02 NOTE — DISCUSSION/SUMMARY
[FreeTextEntry1] : Pulmonary carcinoid\par Subcentimeter pulmonary nodules are stable\par New small ill-defined area of groundglass opacity right upper lobe rule out infectious inflammatory\par Other history as noted\par \par Recommendations\par Chest CT repeat 6 months\par Doxycycline 100 mg 1 tablet p.o. twice daily\par ProAir as needed\par Sample provided Brio Ellipta 1 puff daily with instructions to rinse\par Office follow-up with PFT\par 3-7 minute discussion with patient about smoking cessation was initiated with patient showing interest in attempting to quit smoking. Problems with risks of continued tobacco smoking including respiratory, cardiovascular, and oncological problems were discussed. Advice on smoking cessation was reiterated including methods of quitting, setting a date to quit, and different medicines and support systems available for smoking cessation was discussed. Addressed  options including nicotine patches gums lozenges, Wellbutrin, Chantix as well as smoking cessation program.\par \par \par

## 2021-03-02 NOTE — PHYSICAL EXAM
[General Appearance - Well Developed] : well developed [Normal Appearance] : normal appearance [Well Groomed] : well groomed [General Appearance - Well Nourished] : well nourished [No Deformities] : no deformities [General Appearance - In No Acute Distress] : no acute distress [Normal Conjunctiva] : the conjunctiva exhibited no abnormalities [Eyelids - No Xanthelasma] : the eyelids demonstrated no xanthelasmas [Normal Oropharynx] : normal oropharynx [I] : I [Neck Appearance] : the appearance of the neck was normal [Neck Cervical Mass (___cm)] : no neck mass was observed [Jugular Venous Distention Increased] : there was no jugular-venous distention [Thyroid Diffuse Enlargement] : the thyroid was not enlarged [Heart Rate And Rhythm] : heart rate and rhythm were normal [Heart Sounds] : normal S1 and S2 [Murmurs] : no murmurs present [Arterial Pulses Normal] : the arterial pulses were normal [Edema] : no peripheral edema present [Respiration, Rhythm And Depth] : normal respiratory rhythm and effort [Exaggerated Use Of Accessory Muscles For Inspiration] : no accessory muscle use [Auscultation Breath Sounds / Voice Sounds] : lungs were clear to auscultation bilaterally [Chest Palpation] : palpation of the chest revealed no abnormalities [Lungs Percussion] : the lungs were normal to percussion [Bowel Sounds] : normal bowel sounds [Abdomen Soft] : soft [Abdomen Tenderness] : non-tender [Abdomen Mass (___ Cm)] : no abdominal mass palpated [Abnormal Walk] : normal gait [Gait - Sufficient For Exercise Testing] : the gait was sufficient for exercise testing [Nail Clubbing] : no clubbing of the fingernails [Cyanosis, Localized] : no localized cyanosis [Petechial Hemorrhages (___cm)] : no petechial hemorrhages [] : no ischemic changes [Deep Tendon Reflexes (DTR)] : deep tendon reflexes were 2+ and symmetric [Sensation] : the sensory exam was normal to light touch and pinprick [No Focal Deficits] : no focal deficits [Oriented To Time, Place, And Person] : oriented to person, place, and time [Impaired Insight] : insight and judgment were intact [Affect] : the affect was normal [Mood] : the mood was normal [FreeTextEntry1] : non icteric

## 2021-03-02 NOTE — PROCEDURE
[FreeTextEntry1] : [facility]\par February 16, 2021\par Comparison study of January 18, 2020 and July 18, 2019\par Stable postop surgical changes right middle lobe lobectomy\par Stable 3 mm noncalcified subpleural nodular density right lung apex\par Small area of ill-defined groundglass opacity right upper lobe\par Additional recommendation repeat scan in 6 to 12 months\par \par \par \par PFT 3/11/2019\par normal flow rates\par  Normal lung volumes\par  DLCO 78 % pred \par \par CT chest February 25, 2019\par Right middle lobe nodule 1.6 x 1.2 cm\par Nodule context and mediastinum and there is a thin strand extending from the nodule to the anterior pleural space\par Mediastinal adenopathy\par \par ADDENDUM\par Data review March 12, 2019\par CBC within normal limits\par Alpha-1 antitrypsin titer normal range pending ANCA  ACE level asthma profile with Rast testing\par ANCA negative\par serum IgE 172\par Serum Rast testing March 18, 2019\par Positive Common ragweed\par Positive Jude grass\par Negative for mold\par Flu panel negative\par \par CO 23 ppm\par Spirometry 4/10/2019\par

## 2021-03-02 NOTE — REVIEW OF SYSTEMS
[Depression] : depression [Anxiety] : anxiety [Negative] : Sleep Disorder [de-identified] : BiPolar Disorder

## 2021-03-03 ENCOUNTER — NON-APPOINTMENT (OUTPATIENT)
Age: 41
End: 2021-03-03

## 2021-04-29 ENCOUNTER — APPOINTMENT (OUTPATIENT)
Dept: SPINE | Facility: CLINIC | Age: 41
End: 2021-04-29
Payer: COMMERCIAL

## 2021-04-29 VITALS
HEART RATE: 97 BPM | BODY MASS INDEX: 24.45 KG/M2 | HEIGHT: 63 IN | SYSTOLIC BLOOD PRESSURE: 158 MMHG | OXYGEN SATURATION: 94 % | DIASTOLIC BLOOD PRESSURE: 95 MMHG | WEIGHT: 138 LBS

## 2021-04-29 DIAGNOSIS — M48.00 SPINAL STENOSIS, SITE UNSPECIFIED: ICD-10-CM

## 2021-04-29 PROCEDURE — 99212 OFFICE O/P EST SF 10 MIN: CPT

## 2021-04-29 PROCEDURE — 99072 ADDL SUPL MATRL&STAF TM PHE: CPT

## 2021-04-29 NOTE — DATA REVIEWED
[de-identified] : MRI of cervical spine from [facility] 4/27/2021  [de-identified] : Cervical xray from [email or web address] 4/27/2021

## 2021-04-29 NOTE — ASSESSMENT
[FreeTextEntry1] : 40 yo female with past history of ACDF and recurrent right sided arm radicular   pain.   HA are present.  On imaging there is no motion where the prior cervical fusion was performed on xrays and no ligamentous laxity and no instability.  MRI shows a small herniation of disc with no cord compression.   An MRI of the brain w/wo contrast will be ordered to  evaluate the known meningioma.  She will be sent to  pain management, Dr. Fields.  Her psychiatrist will be called to discuss addition of Cymbalta.  No cervical surgery is indicated.
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
## 2021-04-29 NOTE — PHYSICAL EXAM
[Motor Strength] : muscle strength was normal in all four extremities [Sensation Tactile Decrease] : light touch was intact [Abnormal Walk] : normal gait [2+] : Ankle jerk left 2+ [Dowell] : Dowell's sign was not demonstrated

## 2021-04-30 ENCOUNTER — NON-APPOINTMENT (OUTPATIENT)
Age: 41
End: 2021-04-30

## 2021-05-11 ENCOUNTER — NON-APPOINTMENT (OUTPATIENT)
Age: 41
End: 2021-05-11

## 2021-05-13 ENCOUNTER — APPOINTMENT (OUTPATIENT)
Dept: SPINE | Facility: CLINIC | Age: 41
End: 2021-05-13
Payer: COMMERCIAL

## 2021-05-13 PROCEDURE — 99212 OFFICE O/P EST SF 10 MIN: CPT | Mod: 95

## 2021-05-13 NOTE — HISTORY OF PRESENT ILLNESS
[Home] : at home, [unfilled] , at the time of the visit. [Medical Office: ([facility])___] : at the medical office located in  [Other:____] : [unfilled] [Verbal consent obtained from patient] : the patient, [unfilled] [FreeTextEntry4] : Kim Lombardo  [FreeTextEntry1] : \par \par

## 2021-05-13 NOTE — ASSESSMENT
[FreeTextEntry1] : \par \par Ms. Cleary is three post ACDF and had recurrent symptoms after lifting a 170 person.   She will obtain a CT scan of the cervical spine to evaluate for arthrodesis and a flexion/extension cervical xray.  A lumbar flexion extension xray and scoliosis will be obtained as well.  Once these are complete she will return to the office.

## 2021-05-13 NOTE — REASON FOR VISIT
[Follow-Up: _____] : a [unfilled] follow-up visit [Other: _____] : [unfilled] [FreeTextEntry1] : \par \par 41 year old female with a past history of cervical herniated disc and s/p ACDF.   She had recurrent right sided cervical radiculopathy after lifting her son.  Her right arm is tingling and numb with HA.  She had an MRI that showed no cord compression.  She continues to have right arm pain.  A CT scan was ordered but not completed. She c/o lower back pain as well.

## 2021-05-18 ENCOUNTER — APPOINTMENT (OUTPATIENT)
Dept: PULMONOLOGY | Facility: CLINIC | Age: 41
End: 2021-05-18

## 2021-05-21 ENCOUNTER — APPOINTMENT (OUTPATIENT)
Dept: RADIOLOGY | Facility: CLINIC | Age: 41
End: 2021-05-21
Payer: COMMERCIAL

## 2021-05-21 ENCOUNTER — OUTPATIENT (OUTPATIENT)
Dept: OUTPATIENT SERVICES | Facility: HOSPITAL | Age: 41
LOS: 1 days | End: 2021-05-21
Payer: COMMERCIAL

## 2021-05-21 DIAGNOSIS — Z00.8 ENCOUNTER FOR OTHER GENERAL EXAMINATION: ICD-10-CM

## 2021-05-21 DIAGNOSIS — Z98.890 OTHER SPECIFIED POSTPROCEDURAL STATES: Chronic | ICD-10-CM

## 2021-05-21 PROCEDURE — 72052 X-RAY EXAM NECK SPINE 6/>VWS: CPT | Mod: 26,59

## 2021-05-21 PROCEDURE — 72083 X-RAY EXAM ENTIRE SPI 4/5 VW: CPT | Mod: 26

## 2021-05-21 PROCEDURE — 72110 X-RAY EXAM L-2 SPINE 4/>VWS: CPT

## 2021-05-21 PROCEDURE — 72082 X-RAY EXAM ENTIRE SPI 2/3 VW: CPT

## 2021-05-21 PROCEDURE — 72052 X-RAY EXAM NECK SPINE 6/>VWS: CPT

## 2021-05-24 ENCOUNTER — APPOINTMENT (OUTPATIENT)
Dept: SPINE | Facility: CLINIC | Age: 41
End: 2021-05-24
Payer: COMMERCIAL

## 2021-05-24 VITALS
WEIGHT: 138 LBS | DIASTOLIC BLOOD PRESSURE: 98 MMHG | SYSTOLIC BLOOD PRESSURE: 150 MMHG | BODY MASS INDEX: 24.45 KG/M2 | HEART RATE: 109 BPM | HEIGHT: 63 IN | TEMPERATURE: 98.2 F | OXYGEN SATURATION: 96 %

## 2021-05-24 PROCEDURE — 99212 OFFICE O/P EST SF 10 MIN: CPT

## 2021-05-24 PROCEDURE — 99072 ADDL SUPL MATRL&STAF TM PHE: CPT

## 2021-05-24 NOTE — DATA REVIEWED
[de-identified] : CT scan from [facility] 5/13/.  [de-identified] : Cervical xrays from  [facility] 5/21/2021

## 2021-05-24 NOTE — ASSESSMENT
[FreeTextEntry1] : \par \par Cervical Radiculopathy and lower back pain without radicular pain.  CT and scoliosis xrays shows DJD and no scoliosis.  She will have a  MRI of lower spine and see Dr AMI Powers for pain management.  She will return once the MRI of lumbar spine is available for review.

## 2021-05-24 NOTE — REASON FOR VISIT
[Follow-Up: _____] : a [unfilled] follow-up visit [Friend] : friend [FreeTextEntry1] : \par 41 year old female with cervical radiculopathy and now reporting mid back pain.  She brings a CT scan of the cervical spine and scoliosis film. Cervical CT scan shows solid arthrodesis. Her back pain is  a 10/10.  She has trouble performing  her routine tasks.  Scoliosis films show no scoliosis.  DJD is seen on the cervical spine.  On x-ray films show no scoliosis and no sagittal or coronal imbalance.  It is significant disc space narrowing at L4-5 and L5-S1.

## 2021-07-07 ENCOUNTER — NON-APPOINTMENT (OUTPATIENT)
Age: 41
End: 2021-07-07

## 2021-07-12 DIAGNOSIS — R29.898 OTHER SYMPTOMS AND SIGNS INVOLVING THE MUSCULOSKELETAL SYSTEM: ICD-10-CM

## 2021-07-12 DIAGNOSIS — S19.9XXA UNSPECIFIED INJURY OF NECK, INITIAL ENCOUNTER: ICD-10-CM

## 2021-07-12 DIAGNOSIS — R20.0 ANESTHESIA OF SKIN: ICD-10-CM

## 2021-07-13 ENCOUNTER — EMERGENCY (EMERGENCY)
Facility: HOSPITAL | Age: 41
LOS: 1 days | Discharge: HOME | End: 2021-07-13
Attending: STUDENT IN AN ORGANIZED HEALTH CARE EDUCATION/TRAINING PROGRAM | Admitting: STUDENT IN AN ORGANIZED HEALTH CARE EDUCATION/TRAINING PROGRAM
Payer: COMMERCIAL

## 2021-07-13 ENCOUNTER — NON-APPOINTMENT (OUTPATIENT)
Age: 41
End: 2021-07-13

## 2021-07-13 ENCOUNTER — APPOINTMENT (OUTPATIENT)
Dept: SPINE | Facility: CLINIC | Age: 41
End: 2021-07-13
Payer: COMMERCIAL

## 2021-07-13 VITALS
HEIGHT: 64 IN | OXYGEN SATURATION: 100 % | HEART RATE: 104 BPM | DIASTOLIC BLOOD PRESSURE: 73 MMHG | RESPIRATION RATE: 16 BRPM | TEMPERATURE: 98 F | SYSTOLIC BLOOD PRESSURE: 121 MMHG

## 2021-07-13 VITALS
SYSTOLIC BLOOD PRESSURE: 101 MMHG | RESPIRATION RATE: 15 BRPM | DIASTOLIC BLOOD PRESSURE: 68 MMHG | HEART RATE: 89 BPM | TEMPERATURE: 98 F | OXYGEN SATURATION: 99 %

## 2021-07-13 DIAGNOSIS — Z98.890 OTHER SPECIFIED POSTPROCEDURAL STATES: Chronic | ICD-10-CM

## 2021-07-13 DIAGNOSIS — Z80.0 FAMILY HISTORY OF MALIGNANT NEOPLASM OF DIGESTIVE ORGANS: ICD-10-CM

## 2021-07-13 DIAGNOSIS — Z72.0 TOBACCO USE: ICD-10-CM

## 2021-07-13 DIAGNOSIS — M54.5 LOW BACK PAIN: ICD-10-CM

## 2021-07-13 DIAGNOSIS — F17.200 NICOTINE DEPENDENCE, UNSPECIFIED, UNCOMPLICATED: ICD-10-CM

## 2021-07-13 PROCEDURE — 99072 ADDL SUPL MATRL&STAF TM PHE: CPT

## 2021-07-13 PROCEDURE — 99213 OFFICE O/P EST LOW 20 MIN: CPT

## 2021-07-13 PROCEDURE — 73030 X-RAY EXAM OF SHOULDER: CPT | Mod: 26,RT

## 2021-07-13 PROCEDURE — 73000 X-RAY EXAM OF COLLAR BONE: CPT | Mod: 26,RT

## 2021-07-13 PROCEDURE — 99284 EMERGENCY DEPT VISIT MOD MDM: CPT

## 2021-07-13 PROCEDURE — 73060 X-RAY EXAM OF HUMERUS: CPT | Mod: 26,RT

## 2021-07-13 RX ORDER — DIAZEPAM 5 MG
2 TABLET ORAL ONCE
Refills: 0 | Status: DISCONTINUED | OUTPATIENT
Start: 2021-07-13 | End: 2021-07-13

## 2021-07-13 RX ORDER — LIDOCAINE 4 G/100G
1 CREAM TOPICAL ONCE
Refills: 0 | Status: COMPLETED | OUTPATIENT
Start: 2021-07-13 | End: 2021-07-13

## 2021-07-13 RX ORDER — KETOROLAC TROMETHAMINE 30 MG/ML
15 SYRINGE (ML) INJECTION ONCE
Refills: 0 | Status: DISCONTINUED | OUTPATIENT
Start: 2021-07-13 | End: 2021-07-13

## 2021-07-13 RX ORDER — DIAZEPAM 5 MG
1 TABLET ORAL
Qty: 3 | Refills: 0
Start: 2021-07-13 | End: 2021-07-13

## 2021-07-13 RX ADMIN — Medication 15 MILLIGRAM(S): at 16:06

## 2021-07-13 RX ADMIN — Medication 2 MILLIGRAM(S): at 15:54

## 2021-07-13 RX ADMIN — LIDOCAINE 1 PATCH: 4 CREAM TOPICAL at 13:49

## 2021-07-13 RX ADMIN — Medication 15 MILLIGRAM(S): at 13:50

## 2021-07-13 NOTE — ED PROVIDER NOTE - PROGRESS NOTE DETAILS
Akila VALERIO: pt still with pain reports  here and can take her home will give low dose valium. Akila VALERIO: Pt assessed at beside. Pt resting comfortably, pain controlled, pt questions answered. Vital signs stable. Stable for dc w ortho/pmd fu. strict return precautions discussed.
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
## 2021-07-13 NOTE — ED PROVIDER NOTE - PATIENT PORTAL LINK FT
You can access the FollowMyHealth Patient Portal offered by [facility] by registering at the following website: [email or web address]. By joining [facility]’s FollowMyHealth portal, you will also be able to view your health information using other applications (apps) compatible with our system.

## 2021-07-13 NOTE — ED PROVIDER NOTE - NSFOLLOWUPINSTRUCTIONS_ED_ALL_ED_FT
Thank you for visiting our Emergency Department, it has been a pleasure taking part in your healthcare.    Your discharge diagnosis is: shoulder injury  Please take all discharge medications as indicated below:  Take Motrin/Tylenol for pain as needed, please follow instructions on manufacturers label. If you have any questions please consult a pharmacist or your PMD.  Valium 2mg, once every 8 hours as needed for pain  DO NOT TAKE OTHER MUSCLE RELAXERS / OPIATE MEDICATIONS WHILE TAKING THIS MEDICATION  Please follow up with your PMD within x48 hours.  Please follow up with orthopedics within x48 hours.  A copy of resulted labs, imaging, and findings have been provided to you.   You have had a detailed discussion with your provider regarding your diagnosis, care management and discharge planning including, but not limited to: return precautions, follow up visits with existing or new providers, new prescriptions and/or medication changes, wound and/or splint/cast care or other care   aspects specific to your diagnosis and treatment. You have been given the opportunity to have your questions answered. At this time you have been deemed stable and fit for discharge.  Return precautions to the Emergency Department include but are not limited to: unrelenting nausea, vomiting, fever, chills, chest pain, shortness of breath, dizziness, chest or abdominal pain, worsening back pain, syncope, blood in urine or stool, headache that doesn't resolve, numbness or tingling, loss of sensation, loss of motor function, or any other concerning symptoms.

## 2021-07-13 NOTE — ED PROVIDER NOTE - PHYSICAL EXAMINATION
Akila VALERIO:  VITALS: Initial triage and subsequent vitals have been reviewed by me.  GEN APPEARANCE: WDWN, alert and cooperative, non-toxic appearing and in NAD  HEAD: Atraumatic, normocephalic   EYES: PERRLa, EOMI, vision grossly intact.   EARS: Gross hearing intact.   NOSE: No nasal discharge, no external evidence of epistaxis.   NECK: Supple  CV: RRR, S1S2, no c/r/m/g. No cyanosis or pallor. Extremities warm, well perfused. Cap refill <2 seconds. No bruits.   LUNGS: CTAB. No wheezing. No rales. No rhonchi. No diminished breath sounds.   ABDOMEN: Soft, NTND. No guarding or rebound. No masses.   MSK/EXT: Spine appears normal, no spine point tenderness. No CVA ttp. Normal muscular development. Pelvis stable. No obvious joint or bony deformity, no peripheral edema. R trap in spasm. RUE 4+/5 LUE 5/5    NEURO: Alert, follows commands. Weight bearing normal. Speech normal. Sensation and motor normal x4 extremities.   SKIN: Normal color for race, warm, dry and intact. No evidence of rash.  PSYCH: Normal mood and affect.
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
## 2021-07-13 NOTE — ED PROVIDER NOTE - PSH
History of lung surgery  right vats RML lobectomy 4/2019  S/P cervical discectomy  2019  S/P lumpectomy of breast  left 2018

## 2021-07-13 NOTE — REVIEW OF SYSTEMS
[Arm Weakness] : arm weakness [Hand Weakness] :  hand weakness [Poor Coordination] : poor coordination [Difficulty Writing] : difficulty writing [Numbness] : numbness [Tingling] : tingling [Difficulty Walking] : difficulty walking [Ataxia] : ataxia [Negative] : Heme/Lymph

## 2021-07-13 NOTE — PHYSICAL EXAM
[General Appearance - Alert] : alert [General Appearance - In No Acute Distress] : in no acute distress [Oriented To Time, Place, And Person] : oriented to person, place, and time [Impaired Insight] : insight and judgment were intact [Affect] : the affect was normal [Person] : oriented to person [Place] : oriented to place [Time] : oriented to time [Short Term Intact] : short term memory intact [Remote Intact] : remote memory intact [Span Intact] : the attention span was normal [Concentration Intact] : normal concentrating ability [Fluency] : fluency intact [Comprehension] : comprehension intact [Current Events] : adequate knowledge of current events [Past History] : adequate knowledge of personal past history [Vocabulary] : adequate range of vocabulary [Cranial Nerves Optic (II)] : visual acuity intact bilaterally,  pupils equal round and reactive to light [Cranial Nerves Oculomotor (III)] : extraocular motion intact [Cranial Nerves Trigeminal (V)] : facial sensation intact symmetrically [Cranial Nerves Facial (VII)] : face symmetrical [Cranial Nerves Vestibulocochlear (VIII)] : hearing was intact bilaterally [Cranial Nerves Glossopharyngeal (IX)] : tongue and palate midline [Cranial Nerves Accessory (XI - Cranial And Spinal)] : head turning and shoulder shrug symmetric [Cranial Nerves Hypoglossal (XII)] : there was no tongue deviation with protrusion [Motor Tone] : muscle tone was normal in all four extremities [Motor Strength] : muscle strength was normal in all four extremities [No Muscle Atrophy] : normal bulk in all four extremities [Sensation Tactile Decrease] : light touch was intact [Balance] : balance was intact [Past-pointing] : there was no past-pointing [Tremor] : no tremor present [2+] : Triceps left 2+ [Dowell] : Dowell's sign was not demonstrated [No Visual Abnormalities] : no visible abnormalities [Full ROM] : full ROM [Normal] : normal [Intact] : all motor groups within normal limits of strength and tone bilaterally [Sclera] : the sclera and conjunctiva were normal [Outer Ear] : the ears and nose were normal in appearance [Neck Appearance] : the appearance of the neck was normal [] : no respiratory distress [Abnormal Walk] : normal gait [Skin Color & Pigmentation] : normal skin color and pigmentation
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
## 2021-07-16 ENCOUNTER — APPOINTMENT (OUTPATIENT)
Dept: ORTHOPEDIC SURGERY | Facility: CLINIC | Age: 41
End: 2021-07-16

## 2021-08-30 PROBLEM — D3A.090 CARCINOID TUMOR OF LUNG: Status: ACTIVE | Noted: 2019-04-15

## 2021-08-30 PROBLEM — R91.1 LUNG NODULE: Status: ACTIVE | Noted: 2021-02-26

## 2021-08-30 NOTE — ASSESSMENT
[FreeTextEntry1] : Shelby Cleary is a 40 year old female, current smoker (1/2 PPD) with a history of bipolar disorder, lumpectomy for benign breast lesion, and cervical spine surgery (anterior-cervical discectomy). Lung mass incidentally found during workup for breast lesion. \par \par  s/p uniportal Right VATS, RML wedge resection x1, RMLobectomy on 4/15/19, pathology of RML revealed typical carcinoid, T1bNx. Additionally, nasopharynx biopsy revealed reactive lymphoid hyperplasia. \par \par I have independently reviewed the medical records and imaging at the time of this office consultation, and discussed the following interpretations and recommendations with the patient:\par -\par Recommendations reviewed with patient during this office visit, and all questions answered; Patient instructed on the importance of follow up and verbalizes understanding.\par

## 2021-08-30 NOTE — HISTORY OF PRESENT ILLNESS
[FreeTextEntry1] : Shelby Cleary is a 41 year old female s/p uniportal Right VATS, RML wedge resection x1, RMLobectomy on 4/15/19, pathology of RML revealed typical carcinoid, T1bNx. Additionally, nasopharynx biopsy revealed reactive lymphoid hyperplasia. \par \par She is a current smoker (1/2 PPD) with a history of bipolar disorder, lumpectomy for benign breast lesion, and cervical spine surgery (anterior-cervical discectomy). Lung mass was incidentally found during workup for breast lesion. \par \par She was discharged home on 4/16/19. She presented to the ER on 4/17/19 after a syncopal episode - workup revealed an elevated D-dimer, however, she left AMA prior to CT Chest. She states that her syncopal episode was due to taking all of her medications at once and has had no similar episodes since. \par \par Of note, patient had laser surgery of right leg for varicose vein on 01/17/2020 and lump removal from spine on 02/26/2020. \par \par Chest chest on 01/18/2020:\par - post-op changes\par - mild emphysematous\par - no new or suspicious pulmonary nodule\par \par CXR on 08/14/2020: \par - clear lungs \par - no significant interval change\par \par CT chest on 2/16/21:\par - Post op changes\par - 3 mm noncalcified subpleural nodular density at the later right lung apex, which is stable\par - Small areas of ill-defined ggo centrally within the RUL\par \par CT chest on ..\par \par Patient presents to office for follow up. \par \par

## 2021-08-30 NOTE — CONSULT LETTER
[Dear  ___] : Dear  [unfilled], [Courtesy Letter:] : I had the pleasure of seeing your patient, [unfilled], in my office today. [Please see my note below.] : Please see my note below. [Consult Closing:] : Thank you very much for allowing me to participate in the care of this patient.  If you have any questions, please do not hesitate to contact me. [Sincerely,] : Sincerely, [FreeTextEntry2] : Dr. Robert Grimes (Pulm/Ref)\par Dr. Jonas Carmen (PCP) \par  [FreeTextEntry3] : Santosh Vasquez MD, FACS \par Chief, Division of Thoracic Surgery \par Director, Minimally Invasive Thoracic Surgery \par Department of Cardiovascular and Thoracic Surgery \par [facility] \par , Cardiovascular and Thoracic Surgery\par \par \par

## 2021-08-31 ENCOUNTER — APPOINTMENT (OUTPATIENT)
Dept: THORACIC SURGERY | Facility: CLINIC | Age: 41
End: 2021-08-31

## 2021-08-31 DIAGNOSIS — R91.1 SOLITARY PULMONARY NODULE: ICD-10-CM

## 2021-08-31 DIAGNOSIS — D3A.090 BENIGN CARCINOID TUMOR OF THE BRONCHUS AND LUNG: ICD-10-CM

## 2021-11-09 ENCOUNTER — TRANSCRIPTION ENCOUNTER (OUTPATIENT)
Age: 41
End: 2021-11-09

## 2021-11-15 ENCOUNTER — APPOINTMENT (OUTPATIENT)
Dept: SPINE | Facility: CLINIC | Age: 41
End: 2021-11-15

## 2022-01-01 NOTE — PRE-OP CHECKLIST -  HIBICLENS SHOWER 2 DATE
This note was copied from the mother's chart.  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 No questions at this time.   26-Nov-2018

## 2022-02-15 NOTE — H&P PST ADULT - ACTIVITY
Pt appears on [facility] chronic disease panel as out of parameters for HTN.  Pt has RTC 9/2018 med chk/fast labs, placed CSS BP chk due now   able to walk up 1 flight of stairs

## 2022-02-17 NOTE — PATIENT PROFILE ADULT - IS PATIENT PREGNANT?
Patient ID: Brittaney is a 38 year old female.    Chief Compliant  Chief Complaint   Patient presents with   • Annual Exam       HPI    39 yo F presents for annual physical     Pt has been doing well, feels tired - works midnight shifts .   Denies any cp, sob, HA, dizziness or palpitations - has episodes of migraines once to twice a month controlled with Excedrin         Review of Systems:  Review of Systems   All other systems reviewed and are negative.      Physical Exam:  Physical Exam  Constitutional:       Appearance: She is obese.   HENT:      Right Ear: Tympanic membrane, ear canal and external ear normal.      Left Ear: Tympanic membrane, ear canal and external ear normal.   Eyes:      Conjunctiva/sclera: Conjunctivae normal.      Pupils: Pupils are equal, round, and reactive to light.   Cardiovascular:      Rate and Rhythm: Normal rate and regular rhythm.      Pulses: Normal pulses.      Heart sounds: Normal heart sounds.   Pulmonary:      Effort: Pulmonary effort is normal.      Breath sounds: Normal breath sounds.   Abdominal:      General: Abdomen is flat.      Palpations: Abdomen is soft.   Musculoskeletal:         General: Normal range of motion.   Neurological:      General: No focal deficit present.      Mental Status: She is oriented to person, place, and time.           Vitals  Visit Vitals  /86 (BP Location: RUE - Right upper extremity, Patient Position: Sitting, Cuff Size: Regular)   Pulse (!) 109   Temp 98 °F (36.7 °C) (Temporal)   Ht 5' 3\" (1.6 m)   Wt 79.8 kg (175 lb 13.1 oz)   LMP 02/06/2022 (Exact Date)   SpO2 98%   BMI 31.14 kg/m²       Allergies   ALLERGIES:   Allergen Reactions   • Soy Allergy   (Food Or Med) HIVES     Family History   Family History   Problem Relation Age of Onset   • Hypertension Mother    • Cancer Father      Meds  Outpatient Current Medications as of as of 2/17/2022       Disp Refills Start End    Probiotic Product (PROBIOTIC BLEND PO) (Taking)        Class:  Historical Med    Route: Oral    Multiple Vitamin (MULTIVITAMIN ADULT PO) (Taking)        Class: Historical Med    Route: Oral        Social History  Social History     Socioeconomic History   • Marital status:      Spouse name: Not on file   • Number of children: Not on file   • Years of education: Not on file   • Highest education level: Not on file   Occupational History   • Not on file   Tobacco Use   • Smoking status: Never Smoker   • Smokeless tobacco: Never Used   Vaping Use   • Vaping Use: never used   Substance and Sexual Activity   • Alcohol use: No   • Drug use: Never   • Sexual activity: Not Currently   Other Topics Concern   • Not on file   Social History Narrative   • Not on file     Social Determinants of Health     Financial Resource Strain: Not on file   Food Insecurity: Not on file   Transportation Needs: Not on file   Physical Activity: Not on file   Stress: Not on file   Social Connections: Not on file   Intimate Partner Violence: Not on file       Assessment & Plan  Brittaney was seen today for annual exam.    Diagnoses and all orders for this visit:    Annual physical exam  -     THYROID STIMULATING HORMONE; Future  -     LIPID PANEL WITH REFLEX; Future  -     VITAMIN D -25 HYDROXY; Future  -     BASIC METABOLIC PANEL; Future    Encounter for screening mammogram for malignant neoplasm of breast  -     MAMMO SCREENING BILATERAL W MARTHA; Future    Migraine without aura and without status migrainosus, not intractable      - obtain labs per orders  - discussed diet control and weight loss measures  - migraine controlled with Excedrin   -mammo ordered - pap scheduled for next month at Dr Asencio's office        no

## 2022-02-22 NOTE — ASU PREOP CHECKLIST - WARM FLUIDS/WARM BLANKETS
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 She has been taking Eliquis was 2.5 mg b.i.d. for anticoagulation and using ace wraps for compression when needed. There is some residual edema. No current pain reported. She is active, ambulates as frequently as possible and elevates her legs at rest. no current pain reported. No fever or chills. She is a nonsmoker. Has edema and hyperpigmentation, no ulceration.
no

## 2022-02-22 NOTE — ASU PREOP CHECKLIST - NS PREOP CHK HIBICLENS NA
Biopsy site to right breast clear with Dermabond dry and intact. No firmness or swelling noted at or around biopsy site. Denies pain.    N/A

## 2022-06-20 NOTE — H&P PST ADULT - FUNCTIONAL SCREEN CURRENT LEVEL: AMBULATION, MLM
Oculoplastic Surgeon Procedure Text (A): After obtaining clear surgical margins the patient was sent to oculoplastics for surgical repair.  The patient understands they will receive post-surgical care and follow-up from the referring physician's office. 0 = independent

## 2022-10-26 ENCOUNTER — EMERGENCY (EMERGENCY)
Facility: HOSPITAL | Age: 42
LOS: 0 days | Discharge: HOME | End: 2022-10-26
Attending: EMERGENCY MEDICINE

## 2022-10-26 VITALS
OXYGEN SATURATION: 95 % | HEART RATE: 77 BPM | DIASTOLIC BLOOD PRESSURE: 89 MMHG | WEIGHT: 149.91 LBS | SYSTOLIC BLOOD PRESSURE: 129 MMHG | HEIGHT: 64 IN | RESPIRATION RATE: 16 BRPM | TEMPERATURE: 98 F

## 2022-10-26 VITALS
SYSTOLIC BLOOD PRESSURE: 119 MMHG | TEMPERATURE: 98 F | RESPIRATION RATE: 17 BRPM | HEART RATE: 67 BPM | DIASTOLIC BLOOD PRESSURE: 80 MMHG | OXYGEN SATURATION: 95 %

## 2022-10-26 DIAGNOSIS — Z98.890 OTHER SPECIFIED POSTPROCEDURAL STATES: Chronic | ICD-10-CM

## 2022-10-26 DIAGNOSIS — Z87.39 PERSONAL HISTORY OF OTHER DISEASES OF THE MUSCULOSKELETAL SYSTEM AND CONNECTIVE TISSUE: ICD-10-CM

## 2022-10-26 DIAGNOSIS — F32.A DEPRESSION, UNSPECIFIED: ICD-10-CM

## 2022-10-26 DIAGNOSIS — F41.9 ANXIETY DISORDER, UNSPECIFIED: ICD-10-CM

## 2022-10-26 DIAGNOSIS — Z85.118 PERSONAL HISTORY OF OTHER MALIGNANT NEOPLASM OF BRONCHUS AND LUNG: ICD-10-CM

## 2022-10-26 DIAGNOSIS — M54.2 CERVICALGIA: ICD-10-CM

## 2022-10-26 DIAGNOSIS — Z88.5 ALLERGY STATUS TO NARCOTIC AGENT: ICD-10-CM

## 2022-10-26 DIAGNOSIS — Z98.890 OTHER SPECIFIED POSTPROCEDURAL STATES: ICD-10-CM

## 2022-10-26 DIAGNOSIS — M54.12 RADICULOPATHY, CERVICAL REGION: ICD-10-CM

## 2022-10-26 DIAGNOSIS — M25.511 PAIN IN RIGHT SHOULDER: ICD-10-CM

## 2022-10-26 PROCEDURE — 72125 CT NECK SPINE W/O DYE: CPT | Mod: 26,MA

## 2022-10-26 PROCEDURE — 99284 EMERGENCY DEPT VISIT MOD MDM: CPT

## 2022-10-26 RX ORDER — CYCLOBENZAPRINE HYDROCHLORIDE 10 MG/1
10 TABLET, FILM COATED ORAL ONCE
Refills: 0 | Status: COMPLETED | OUTPATIENT
Start: 2022-10-26 | End: 2022-10-26

## 2022-10-26 RX ORDER — OXYCODONE AND ACETAMINOPHEN 5; 325 MG/1; MG/1
1 TABLET ORAL
Qty: 12 | Refills: 0
Start: 2022-10-26 | End: 2022-10-28

## 2022-10-26 RX ORDER — OXYCODONE AND ACETAMINOPHEN 5; 325 MG/1; MG/1
1 TABLET ORAL ONCE
Refills: 0 | Status: DISCONTINUED | OUTPATIENT
Start: 2022-10-26 | End: 2022-10-26

## 2022-10-26 RX ORDER — IBUPROFEN 200 MG
1 TABLET ORAL
Qty: 28 | Refills: 0
Start: 2022-10-26 | End: 2022-11-01

## 2022-10-26 RX ORDER — KETOROLAC TROMETHAMINE 30 MG/ML
60 SYRINGE (ML) INJECTION ONCE
Refills: 0 | Status: DISCONTINUED | OUTPATIENT
Start: 2022-10-26 | End: 2022-10-26

## 2022-10-26 RX ORDER — CYCLOBENZAPRINE HYDROCHLORIDE 10 MG/1
1 TABLET, FILM COATED ORAL
Qty: 15 | Refills: 0
Start: 2022-10-26 | End: 2022-10-30

## 2022-10-26 RX ADMIN — OXYCODONE AND ACETAMINOPHEN 1 TABLET(S): 5; 325 TABLET ORAL at 20:16

## 2022-10-26 RX ADMIN — CYCLOBENZAPRINE HYDROCHLORIDE 10 MILLIGRAM(S): 10 TABLET, FILM COATED ORAL at 20:16

## 2022-10-26 RX ADMIN — Medication 60 MILLIGRAM(S): at 21:58

## 2022-10-26 RX ADMIN — Medication 60 MILLIGRAM(S): at 20:17

## 2022-10-26 RX ADMIN — OXYCODONE AND ACETAMINOPHEN 1 TABLET(S): 5; 325 TABLET ORAL at 21:58

## 2022-10-26 NOTE — ED PROVIDER NOTE - CONSTITUTIONAL, MLM
Well appearing, awake, alert, oriented to person, place, time/situation and in mild distress secondary to pain. normal...

## 2022-10-26 NOTE — ED PROVIDER NOTE - CLINICAL SUMMARY MEDICAL DECISION MAKING FREE TEXT BOX
Pt with chronic cervical reticular pain, now 1.5 years. Will need close ortho spine follow up. Otherwise, have CT cervical spine performed to rule out disc herniation or acute issues of the spine. Otherwise will d/c when pain and reticular symptoms improve.

## 2022-10-26 NOTE — ED PROVIDER NOTE - MUSCULOSKELETAL, MLM
Noted tenderness along paraspinal cervical region, tender just below scapular region with pain shooting down the right arm. Sensation of right arm slightly diminished vs left,  strength present, slightly diminished vs left secondary to pain

## 2022-10-26 NOTE — ED ADULT NURSE REASSESSMENT NOTE - NS ED NURSE REASSESS COMMENT FT1
dc instructions given, discussed prescriptions. patient states  will drive. pain is still there but a little bit better

## 2022-10-26 NOTE — ED PROVIDER NOTE - OBJECTIVE STATEMENT
Pt is a 42 year old female with a PMHx of anxiety, depression, lung nodule, malignant neoplasm lung, sebaceous cyst who presents to the ED today complaining of neck pain. Pt states she is suffering from pain that starts in her back and goes up to her neck, on the right side of the body, and claims she has accompanying weakness of the arm. Pt states she has had neck surgery in the past, for the removal of two discs. The current pain has persisted to some extent for the past year and a half, but has progressively gotten worse over the past two weeks. She has been attending physical therapy, and has not partaken in any recent strenuous activity.

## 2022-10-26 NOTE — ED PROVIDER NOTE - PATIENT PORTAL LINK FT
You can access the FollowMyHealth Patient Portal offered by [facility] by registering at the following website: [email or web address]. By joining [employer]’s FollowMyHealth portal, you will also be able to view your health information using other applications (apps) compatible with our system.

## 2022-10-26 NOTE — ED ADULT NURSE NOTE - NSICDXPASTMEDICALHX_GEN_ALL_CORE_FT
PAST MEDICAL HISTORY:  Anxiety     Depression     Lung nodule     Malignant neoplasm lung, 2019, denies chemo and radiation    Sebaceous cyst

## 2022-10-26 NOTE — ED PROVIDER NOTE - CARE PROVIDER_API CALL
Elvis Lopez (DO)  Orthopaedic Surgery Surgery  [address] [facility], [address] [place] [address]  Phone: [phone]  Fax: [phone]  Follow Up Time: 1-3 Days

## 2022-10-26 NOTE — ED ADULT NURSE NOTE - NSICDXPASTSURGICALHX_GEN_ALL_CORE_FT
PAST SURGICAL HISTORY:  History of lung surgery right vats RML lobectomy 4/2019    S/P cervical discectomy 2019    S/P lumpectomy of breast left 2018

## 2022-10-26 NOTE — ED ADULT TRIAGE NOTE - CHIEF COMPLAINT QUOTE
severe right neck pain radiating to scapula and down right arm x 1.5 years. pain started to increase last night and yelling on phone. denies fall, trauma  going to pain management for lidocaine, cortisone, tramadol

## 2022-10-26 NOTE — ED PROVIDER NOTE - NSFOLLOWUPINSTRUCTIONS_ED_ALL_ED_FT
Cervical Radiculopathy    Close-up of the nerves of the cervical spine.   Cervical radiculopathy happens when a nerve in the neck (a cervical nerve) is pinched or bruised. This condition can happen because of an injury to the cervical spine (vertebrae) in the neck, or as part of the normal aging process. Pressure on the cervical nerves can cause pain or numbness that travels from the neck all the way down to the arm and fingers. This condition usually gets better with rest. Treatment may be needed if the condition does not improve.      What are the causes?    This condition may be caused by:  •A neck injury.      •A bulging (herniated) disk.      •Muscle spasms.      •Muscle tightness in the neck due to overuse.      •Arthritis.      •Breakdown or degeneration in the bones and joints of the spine (spondylosis) due to aging.      •Bone spurs that may develop near the cervical nerves.        What are the signs or symptoms?    Symptoms of this condition include:  •Pain. The pain may travel from the neck to the arm and hand. The pain can be severe or irritating. It may get worse when you move your neck.      •Numbness or tingling in your arm or hand.      •Weakness in the affected arm and hand, in severe cases.        How is this diagnosed?    This condition may be diagnosed based on your symptoms, your medical history, and a physical exam. You may also have tests, including:  •X-rays.      •CT scan.      •MRI.      •Electromyogram (EMG).      •Nerve conduction tests.        How is this treated?    In many cases, treatment is not needed for this condition. With rest, the condition usually gets better over time. If treatment is needed, options may include:  •Wearing a soft neck collar (cervical collar) for short periods of time.      •Doing physical therapy to strengthen your neck muscles.      •Taking medicines. These may include NSAIDs, such as ibuprofen, or oral corticosteroids.      •Having spinal injections, in severe cases.      •Having surgery. This may be needed if other treatments do not help. Different types of surgery may be done depending on the cause of this condition.        Follow these instructions at home:    If you have a cervical collar:     •Wear it as told by your health care provider. Remove it only as told by your health care provider.    •Ask your health care provider if you can remove the cervical collar for cleaning and bathing. If you are allowed to remove the collar for cleaning or bathing:  •Follow instructions from your health care provider about how to remove the collar safely.      •Clean the collar by wiping it with mild soap and water and drying it completely.      •Take out any removable pads in the collar every 1–2 days, and wash them by hand with soap and water. Let them air-dry completely before you put them back in the collar.      •Check your skin under the collar for irritation or sores. If you see any, tell your health care provider.          Managing pain       Bag of ice on a towel on the skin.        A heating pad for use on the painful area.     •Take over-the-counter and prescription medicines only as told by your health care provider.    •If directed, put ice on the affected area. To do this:  •If you have a soft neck collar, remove it as told by your health care provider.      •Put ice in a plastic bag.      •Place a towel between your skin and the bag.      •Leave the ice on for 20 minutes, 2–3 times a day.      •Remove the ice if your skin turns bright red. This is very important. If you cannot feel pain, heat, or cold, you have a greater risk of damage to the area.      •If applying ice does not help, you can try using heat. Use the heat source that your health care provider recommends, such as a moist heat pack or a heating pad.  •Place a towel between your skin and the heat source.      •Leave the heat on for 20–30 minutes.      •Remove the heat if your skin turns bright red. This is especially important if you are unable to feel pain, heat, or cold. You have a greater risk of getting burned.        •Try a gentle neck and shoulder massage to help relieve symptoms.      Activity     •Rest as needed.      •Return to your normal activities as told by your health care provider. Ask your health care provider what activities are safe for you.      •Do stretching and strengthening exercises as told by your health care provider or your physical therapist.      •You may have to avoid lifting. Ask your health care provider how much you can safely lift.      General instructions     •Use a flat pillow when you sleep.      • Do not drive while wearing a cervical collar. If you do not have a cervical collar, ask your health care provider if it is safe to drive while your neck heals.      •Ask your health care provider if the medicine prescribed to you requires you to avoid driving or using machinery.      • Do not use any products that contain nicotine or tobacco. These products include cigarettes, chewing tobacco, and vaping devices, such as e-cigarettes. If you need help quitting, ask your health care provider.      •Keep all follow-up visits. This is important.        Contact a health care provider if:    •Your condition does not improve with treatment.        Get help right away if:    •Your pain gets much worse and is not controlled with medicines.      •You have weakness or numbness in your hand, arm, face, or leg.      •You have a high fever.      •You have a stiff, rigid neck.      •You lose control of your bowels or your bladder (have incontinence).      •You have trouble with walking, balance, or speaking.        Summary    •Cervical radiculopathy happens when a nerve in the neck is pinched or bruised.      •A nerve can get pinched from a bulging disk, arthritis, muscle spasms, or an injury to the neck.      •Symptoms include pain, tingling, or numbness radiating from the neck to the arm or hand. Weakness can also occur in severe cases.      •Treatment may include rest, wearing a cervical collar, and physical therapy. Medicines may be prescribed to help with pain. In severe cases, injections or surgery may be needed.      This information is not intended to replace advice given to you by your health care provider. Make sure you discuss any questions you have with your health care provider.

## 2022-12-16 NOTE — H&P PST ADULT - NSANTHAGERD_ENT_A_CORE
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 Patient stated, \"I want to understand everything better than I am now. \"  Pain:  Patient does not c/o pain. Patient does not appear in pain. Interventions Planned: (Treatment may consist of any combination of the following)  Expressive language, Receptive language, repetition, automatic speech, basic commands in context, contextual Y/N questions, responsive naming tasks, word discrimination tasks  GOALS: (Goals have been discussed and agreed upon with patient.)  Short-Term Functional Goals: Time Frame: 8 weeks  Patient will answer simple yes/no questions with 90% accuracy given minimal cueing. Patient will follow 1 step commands with 80% accuracy given min-mod cueing. Patient will identify objects given verbal descriptions with >80% accuracy given min cues. Patient will complete automatic naming tasks with 90% accuracy given minimal cueing.   Patient will complete basic phrase completion with 70% accuracy given moderate cueing. Patient will name common objects with 70% accuracy given moderate cueing. Discharge Goals: Time Frame: 12 weeks  Patient will increase receptive/expressive language skills to communicate basic wants/needs across environments. Updated Objective Findings:  None Today  Treatment   Expressive/receptive language activities: Answering yes/no questions presented 75% accuracy mod cues  Phrase completion: 8/13x with mod phonemic cues, semantic cues  Following directions (1 step \"open your eyes\"): 8/12x max cues  Participated in conversational topics with appropriate responses 75% of the time related to family members/close neighbors; conversational topics about appointments/upcoming events with appropriate responses to context 70% of the time (intermittent awareness and corrections)  Verbalized family members' names, pet's name, doctor's office names with min cues - stated 10 names with mod cues (used strategy occasionally spelled to aid in the word finding)    Automatic speech tasks:  Independently counting 1-20   Days of the week named 7/7 days independently  Months of the year independently named 10/12 correctly independently  Pledge of allegiance able to recite ~75% with mod cues    Treatment/Session Summary:  Has not returned home exercise program from last week, states he forgot. Gave yes/no questions as HEP today. Communication/Consultation:  None today  Recommendations/Intent for next treatment session: Next visit will focus on expressive and receptive language.     Total Duration:  Time In: 1345  Time Out: 1430  Minutes: 48 Richie Beck SLP    Visit # Therapist initials Date Arrived NS/ Cx < 24 hr >24 hr Cx Visit Comments   1 BC 10/5 X   Initial Assessment Only Today   2 BC 10/10 X      3 BC 10/17 X      4 BC 10/19 X      5 BC 10/21 X      6 BC 10/24 X       BC 11/9  X  No show   7 BC 11/16 X      8 BC 11/21  X  Pt not well [facility] 11/23-28   X SLP out   9 BC 11/30 X      10 BC 12/2 X       BC 12/5   X SLP out   11 BC 12/7 X      12 BC 12/12 X      13 BC 12/14 X      14 BC 12/16 X      15 BC 12/19 X      16 BC 12/21        BC 12/26-30   X SLP out   17 BC 1/4/23                                                               Abbreviations: NS = No Show; CX = cancelled No

## 2023-01-13 ENCOUNTER — EMERGENCY (EMERGENCY)
Facility: HOSPITAL | Age: 43
LOS: 1 days | Discharge: LEFT BEFORE DISCHARGE | End: 2023-01-13
Payer: COMMERCIAL

## 2023-01-13 VITALS
RESPIRATION RATE: 18 BRPM | HEART RATE: 80 BPM | DIASTOLIC BLOOD PRESSURE: 76 MMHG | SYSTOLIC BLOOD PRESSURE: 116 MMHG | TEMPERATURE: 98 F | OXYGEN SATURATION: 98 %

## 2023-01-13 DIAGNOSIS — M79.601 PAIN IN RIGHT ARM: ICD-10-CM

## 2023-01-13 DIAGNOSIS — Z98.890 OTHER SPECIFIED POSTPROCEDURAL STATES: Chronic | ICD-10-CM

## 2023-01-13 DIAGNOSIS — Z53.21 PROCEDURE AND TREATMENT NOT CARRIED OUT DUE TO PATIENT LEAVING PRIOR TO BEING SEEN BY HEALTH CARE PROVIDER: ICD-10-CM

## 2023-01-13 PROCEDURE — 93010 ELECTROCARDIOGRAM REPORT: CPT

## 2023-01-13 PROCEDURE — L9991: CPT
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
## 2023-01-14 ENCOUNTER — EMERGENCY (EMERGENCY)
Facility: HOSPITAL | Age: 43
LOS: 0 days | Discharge: HOME | End: 2023-01-14
Attending: EMERGENCY MEDICINE
Payer: COMMERCIAL

## 2023-01-14 VITALS
SYSTOLIC BLOOD PRESSURE: 110 MMHG | TEMPERATURE: 98 F | HEART RATE: 90 BPM | RESPIRATION RATE: 17 BRPM | DIASTOLIC BLOOD PRESSURE: 80 MMHG | OXYGEN SATURATION: 96 %

## 2023-01-14 VITALS
TEMPERATURE: 98 F | DIASTOLIC BLOOD PRESSURE: 84 MMHG | SYSTOLIC BLOOD PRESSURE: 111 MMHG | OXYGEN SATURATION: 97 % | WEIGHT: 160.06 LBS | HEART RATE: 85 BPM | RESPIRATION RATE: 18 BRPM | HEIGHT: 63 IN

## 2023-01-14 DIAGNOSIS — S50.01XA CONTUSION OF RIGHT ELBOW, INITIAL ENCOUNTER: ICD-10-CM

## 2023-01-14 DIAGNOSIS — R51.9 HEADACHE, UNSPECIFIED: ICD-10-CM
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
## 2023-01-14 DIAGNOSIS — M53.3 SACROCOCCYGEAL DISORDERS, NOT ELSEWHERE CLASSIFIED: ICD-10-CM

## 2023-01-14 DIAGNOSIS — S09.90XA UNSPECIFIED INJURY OF HEAD, INITIAL ENCOUNTER: ICD-10-CM

## 2023-01-14 DIAGNOSIS — M54.9 DORSALGIA, UNSPECIFIED: ICD-10-CM

## 2023-01-14 DIAGNOSIS — M54.2 CERVICALGIA: ICD-10-CM

## 2023-01-14 DIAGNOSIS — Z98.890 OTHER SPECIFIED POSTPROCEDURAL STATES: Chronic | ICD-10-CM

## 2023-01-14 DIAGNOSIS — S30.0XXA CONTUSION OF LOWER BACK AND PELVIS, INITIAL ENCOUNTER: ICD-10-CM

## 2023-01-14 DIAGNOSIS — F17.200 NICOTINE DEPENDENCE, UNSPECIFIED, UNCOMPLICATED: ICD-10-CM

## 2023-01-14 DIAGNOSIS — Z88.5 ALLERGY STATUS TO NARCOTIC AGENT: ICD-10-CM

## 2023-01-14 DIAGNOSIS — S70.11XA CONTUSION OF RIGHT THIGH, INITIAL ENCOUNTER: ICD-10-CM

## 2023-01-14 DIAGNOSIS — M79.604 PAIN IN RIGHT LEG: ICD-10-CM
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
## 2023-01-14 PROCEDURE — 72100 X-RAY EXAM L-S SPINE 2/3 VWS: CPT | Mod: 26

## 2023-01-14 PROCEDURE — 72125 CT NECK SPINE W/O DYE: CPT | Mod: 26,MA

## 2023-01-14 PROCEDURE — 73502 X-RAY EXAM HIP UNI 2-3 VIEWS: CPT | Mod: 26,RT

## 2023-01-14 PROCEDURE — 99285 EMERGENCY DEPT VISIT HI MDM: CPT

## 2023-01-14 PROCEDURE — 70450 CT HEAD/BRAIN W/O DYE: CPT | Mod: 26,MA

## 2023-01-14 RX ORDER — IBUPROFEN 200 MG
600 TABLET ORAL ONCE
Refills: 0 | Status: COMPLETED | OUTPATIENT
Start: 2023-01-14 | End: 2023-01-14

## 2023-01-14 RX ORDER — CLONAZEPAM 1 MG
1 TABLET ORAL
Qty: 0 | Refills: 0 | DISCHARGE

## 2023-01-14 RX ORDER — ATENOLOL 25 MG/1
1 TABLET ORAL
Qty: 0 | Refills: 0 | DISCHARGE

## 2023-01-14 RX ORDER — ACETAMINOPHEN 500 MG
650 TABLET ORAL ONCE
Refills: 0 | Status: DISCONTINUED | OUTPATIENT
Start: 2023-01-14 | End: 2023-01-14

## 2023-01-14 RX ORDER — METHOCARBAMOL 500 MG/1
2 TABLET, FILM COATED ORAL
Qty: 30 | Refills: 0
Start: 2023-01-14 | End: 2023-01-18

## 2023-01-14 RX ORDER — TRAZODONE HCL 50 MG
1 TABLET ORAL
Qty: 0 | Refills: 0 | DISCHARGE

## 2023-01-14 RX ORDER — IBUPROFEN 200 MG
1 TABLET ORAL
Qty: 20 | Refills: 0
Start: 2023-01-14 | End: 2023-01-18

## 2023-01-14 RX ADMIN — Medication 600 MILLIGRAM(S): at 11:50
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
## 2023-01-14 NOTE — ED ADULT NURSE REASSESSMENT NOTE - NS ED NURSE REASSESS COMMENT FT1
pt sitting in chair on cellphone no distress noted. Pt A & O x 4,  pt refuse revital. will continue to monitor.

## 2023-01-14 NOTE — ED ADULT NURSE NOTE - CINV DISCH MEDS REVIEWED YN
Jenelle is coughing - her chest hurts,  Feeling chilled,   She is having head congestion   , headache    She did not go to school today     She was taking  flonase , cetirzine ,     [facility]   Yes
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
## 2023-01-14 NOTE — ED PROVIDER NOTE - PATIENT PORTAL LINK FT
You can access the FollowMyHealth Patient Portal offered by [facility] by registering at the following website: [email or web address]. By joining [employer]’s FollowMyHealth portal, you will also be able to view your health information using other applications (apps) compatible with our system.

## 2023-01-14 NOTE — ED ADULT NURSE NOTE - NSIMPLEMENTINTERV_GEN_ALL_ED
Implemented All Fall Risk Interventions:  [place] to call system. Call bell, personal items and telephone within reach. Instruct patient to call for assistance. Room bathroom lighting operational. Non-slip footwear when patient is off stretcher. Physically safe environment: no spills, clutter or unnecessary equipment. Stretcher in lowest position, wheels locked, appropriate side rails in place. Provide visual cue, wrist band, yellow gown, etc. Monitor gait and stability. Monitor for mental status changes and reorient to person, place, and time. Review medications for side effects contributing to fall risk. Reinforce activity limits and safety measures with patient and family.
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
## 2023-01-14 NOTE — ED ADULT NURSE REASSESSMENT NOTE - NS ED NURSE REASSESS COMMENT FT1
assumed care of patient from Brittnee RN, pt ambulating in hallway, steady gait, axo4, respirations spontaneous and unlabored. Pt requesting something for pain, MD Delaney made aware.  Skin intact. ROM intact.

## 2023-01-14 NOTE — ED ADULT TRIAGE NOTE - HISTORY OF COVID-19 VACCINATION
Spoke to patient regarding scheduling procedure. Patient stated he is still considering scheduling but will hold off at this time and contact the office when/if he decides to move forward.   Yes

## 2023-01-30 ENCOUNTER — APPOINTMENT (OUTPATIENT)
Dept: NEUROLOGY | Facility: CLINIC | Age: 43
End: 2023-01-30
Payer: COMMERCIAL

## 2023-01-30 ENCOUNTER — NON-APPOINTMENT (OUTPATIENT)
Age: 43
End: 2023-01-30

## 2023-01-30 VITALS
HEIGHT: 63 IN | BODY MASS INDEX: 27.46 KG/M2 | WEIGHT: 155 LBS | DIASTOLIC BLOOD PRESSURE: 70 MMHG | SYSTOLIC BLOOD PRESSURE: 128 MMHG

## 2023-01-30 DIAGNOSIS — S09.90XA UNSPECIFIED INJURY OF HEAD, INITIAL ENCOUNTER: ICD-10-CM

## 2023-01-30 DIAGNOSIS — S06.0X9A CONCUSSION WITH LOSS OF CONSCIOUSNESS OF UNSPECIFIED DURATION, INITIAL ENCOUNTER: ICD-10-CM

## 2023-01-30 DIAGNOSIS — Z87.19 PERSONAL HISTORY OF OTHER DISEASES OF THE DIGESTIVE SYSTEM: ICD-10-CM

## 2023-01-30 PROCEDURE — 99204 OFFICE O/P NEW MOD 45 MIN: CPT

## 2023-01-30 RX ORDER — ATENOLOL 25 MG/1
25 TABLET ORAL
Refills: 0 | Status: ACTIVE | COMMUNITY

## 2023-01-30 RX ORDER — PAROXETINE HYDROCHLORIDE 40 MG/1
40 TABLET, FILM COATED ORAL
Refills: 0 | Status: COMPLETED | COMMUNITY
End: 2023-01-30

## 2023-01-30 RX ORDER — DULOXETINE HYDROCHLORIDE 30 MG/1
30 CAPSULE, DELAYED RELEASE PELLETS ORAL
Refills: 0 | Status: ACTIVE | COMMUNITY

## 2023-01-30 RX ORDER — DOXYCYCLINE HYCLATE 100 MG/1
100 CAPSULE ORAL
Qty: 14 | Refills: 0 | Status: COMPLETED | COMMUNITY
Start: 2021-03-02 | End: 2023-01-30

## 2023-01-30 RX ORDER — DULOXETINE HYDROCHLORIDE 60 MG/1
60 CAPSULE, DELAYED RELEASE PELLETS ORAL
Refills: 0 | Status: ACTIVE | COMMUNITY

## 2023-01-30 RX ORDER — CLONAZEPAM 2 MG/1
2 TABLET ORAL
Refills: 0 | Status: COMPLETED | COMMUNITY
End: 2023-01-30

## 2023-01-30 RX ORDER — TRAZODONE HYDROCHLORIDE 300 MG/1
TABLET ORAL
Refills: 0 | Status: COMPLETED | COMMUNITY
End: 2023-01-30

## 2023-01-30 RX ORDER — OMEPRAZOLE 20 MG/1
20 CAPSULE, DELAYED RELEASE ORAL
Qty: 10 | Refills: 0 | Status: COMPLETED | COMMUNITY
Start: 2020-10-08 | End: 2023-01-30
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
## 2023-01-30 NOTE — PHYSICAL EXAM
[General Appearance - Alert] : alert [General Appearance - In No Acute Distress] : in no acute distress [Oriented To Time, Place, And Person] : oriented to person, place, and time [Affect] : the affect was normal [Memory Recent] : recent memory was not impaired [Memory Remote] : remote memory was not impaired [Cranial Nerves Optic (II)] : visual acuity intact bilaterally,  visual fields full to confrontation, pupils equal round and reactive to light [Cranial Nerves Oculomotor (III)] : extraocular motion intact [Cranial Nerves Trigeminal (V)] : facial sensation intact symmetrically [Cranial Nerves Facial (VII)] : face symmetrical [Cranial Nerves Vestibulocochlear (VIII)] : hearing was intact bilaterally [Cranial Nerves Glossopharyngeal (IX)] : tongue and palate midline [Cranial Nerves Accessory (XI - Cranial And Spinal)] : head turning and shoulder shrug symmetric [Cranial Nerves Hypoglossal (XII)] : there was no tongue deviation with protrusion [Motor Tone] : muscle tone was normal in all four extremities [Motor Strength] : muscle strength was normal in all four extremities [Sensation Tactile Decrease] : light touch was intact [Sensation Vibration Decrease] : vibration was intact [Abnormal Walk] : normal gait [2+] : Patella left 2+ [1+] : Ankle jerk left 1+ [Optic Disc Abnormality] : the optic disc were normal in size and color [Dysarthria] : no dysarthria [Aphasia] : no dysphasia/aphasia [Coordination - Dysmetria Impaired Finger-to-Nose Bilateral] : not present [Plantar Reflex Right Only] : normal on the right [Plantar Reflex Left Only] : normal on the left

## 2023-01-30 NOTE — CONSULT LETTER
[Dear  ___] : Dear  [unfilled], [Courtesy Letter:] : I had the pleasure of seeing your patient, [unfilled], in my office today. [Please see my note below.] : Please see my note below. [Consult Closing:] : Thank you very much for allowing me to participate in the care of this patient.  If you have any questions, please do not hesitate to contact me. [Sincerely,] : Sincerely, [FreeTextEntry3] : Ryan Gonzalez MD.

## 2023-01-30 NOTE — DATA REVIEWED
[de-identified] : CT scan of the head was performed on 1/14/2023.\par The study demonstrated no acute pathology.\par Incidental note was made of a small right planum sphenoidale meningioma.\par
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
## 2023-02-28 ENCOUNTER — APPOINTMENT (OUTPATIENT)
Dept: NEUROLOGY | Facility: CLINIC | Age: 43
End: 2023-02-28

## 2023-03-05 NOTE — PATIENT PROFILE ADULT - REASON FOR REFUSAL (REFER PATIENT TO HEALTHCARE PROVIDER FOR FOLLOW-UP):
Patient arrives to the ED via POV with complaints of left wrist pain and limited movement and buttocks pain after a fall. Patient denies hitting head or LOC. does not wamt

## 2023-04-17 NOTE — PRE-ANESTHESIA EVALUATION ADULT - NSANTHOBSERVEDRD_ENT_A_CORE
Miralax 17 grams daily    reduce to every other day if too loose or frequent       Colon prep :     Dulcolax - bisacodyl - 5 mg tablets - Take three pills at 3 pm the afternoon before the procedure.    Get Miralax -  255 gram bottle - and mix in 64 ounces (2 liters) of Gatorade (NOT RED COLOR).     Drink 8 ounces of this every 15 minutes until gone.        No

## 2023-05-10 ENCOUNTER — APPOINTMENT (OUTPATIENT)
Dept: NEUROLOGY | Facility: CLINIC | Age: 43
End: 2023-05-10

## 2023-10-13 NOTE — ED PROVIDER NOTE - PMH
stable Anxiety    Depression    Lung nodule    Malignant neoplasm  lung, 2019, denies chemo and radiation  Sebaceous cyst
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
## 2024-02-05 ENCOUNTER — NON-APPOINTMENT (OUTPATIENT)
Age: 44
End: 2024-02-05

## 2024-02-06 ENCOUNTER — NON-APPOINTMENT (OUTPATIENT)
Age: 44
End: 2024-02-06

## 2024-02-07 ENCOUNTER — APPOINTMENT (OUTPATIENT)
Dept: SPINE | Facility: CLINIC | Age: 44
End: 2024-02-07
Payer: COMMERCIAL

## 2024-02-07 VITALS
HEART RATE: 72 BPM | WEIGHT: 170 LBS | OXYGEN SATURATION: 94 % | BODY MASS INDEX: 29.02 KG/M2 | DIASTOLIC BLOOD PRESSURE: 73 MMHG | SYSTOLIC BLOOD PRESSURE: 108 MMHG | HEIGHT: 64 IN

## 2024-02-07 DIAGNOSIS — G95.9 DISEASE OF SPINAL CORD, UNSPECIFIED: ICD-10-CM

## 2024-02-07 DIAGNOSIS — M54.12 RADICULOPATHY, CERVICAL REGION: ICD-10-CM

## 2024-02-07 PROCEDURE — 99214 OFFICE O/P EST MOD 30 MIN: CPT

## 2024-02-07 RX ORDER — METHOCARBAMOL 500 MG/1
500 TABLET, FILM COATED ORAL EVERY 8 HOURS
Qty: 30 | Refills: 0 | Status: ACTIVE | COMMUNITY
Start: 2024-02-07 | End: 1900-01-01

## 2024-02-07 RX ORDER — OMEPRAZOLE MAGNESIUM 20 MG/1
20 TABLET, DELAYED RELEASE ORAL
Refills: 0 | Status: ACTIVE | COMMUNITY

## 2024-02-07 RX ORDER — ESTRADIOL 10 UG/1
TABLET, FILM COATED VAGINAL
Refills: 0 | Status: ACTIVE | COMMUNITY

## 2024-02-07 RX ORDER — GABAPENTIN 300 MG/1
300 CAPSULE ORAL
Qty: 30 | Refills: 1 | Status: ACTIVE | COMMUNITY
Start: 2024-02-07 | End: 1900-01-01

## 2024-02-07 RX ORDER — METOCLOPRAMIDE 10 MG/1
10 TABLET ORAL EVERY 8 HOURS
Qty: 50 | Refills: 1 | Status: DISCONTINUED | COMMUNITY
Start: 2023-01-30 | End: 2024-02-07

## 2024-02-07 NOTE — PHYSICAL EXAM
[2+] : Triceps left 2+ [1+] : Ankle jerk left 1+ [No Visual Abnormalities] : no visible abnormalities [Pain] : was painful [Restricted] : was restricted [No Tenderness to Palpation] : no spine tenderness on palpation [Normal] : normal [Able to toe walk] : the patient was able to toe walk [Able to heel walk] : the patient was able to heel walk [General Appearance - Alert] : alert [General Appearance - In No Acute Distress] : in no acute distress [General Appearance - Well-Appearing] : healthy appearing [Oriented To Time, Place, And Person] : oriented to person, place, and time [Impaired Insight] : insight and judgment were intact [Person] : oriented to person [Place] : oriented to place [Time] : oriented to time [Short Term Intact] : short term memory intact [Remote Intact] : remote memory intact [Registration Intact] : recent registration memory intact [Span Intact] : the attention span was normal [Concentration Intact] : normal concentrating ability [Visual Intact] : visual attention was ~T not ~L decreased [Fluency] : fluency intact [Comprehension] : comprehension intact [Reading] : reading intact [Current Events] : adequate knowledge of current events [Past History] : adequate knowledge of personal past history [Vocabulary] : adequate range of vocabulary [Cranial Nerves Facial (VII)] : face symmetrical [Cranial Nerves Vestibulocochlear (VIII)] : hearing was intact bilaterally [Cranial Nerves Glossopharyngeal (IX)] : tongue and palate midline [Cranial Nerves Accessory (XI - Cranial And Spinal)] : head turning and shoulder shrug symmetric [Cranial Nerves Hypoglossal (XII)] : there was no tongue deviation with protrusion [Motor Tone] : muscle tone was normal in all four extremities [3] : C8 finger flexors 3/5 [5] : S1 toe walking 5/5 [Abnormal Walk] : normal gait [Balance] : balance was intact [Tremor] : no tremor present [___] : absent on the right [___] : absent on the left [Dowell] : Dowell's sign was not demonstrated [FreeTextEntry7] : decreased sensation on the right arm [L'Hermitte's] : neck flexion did not produce tingling down the spine/arms [Spurling's - Opposite Side] : Negative Spurling's on opposite side [Spurling's Same Side] : Positive Spurling's on same side [Straight-Leg Raise Test - Left] : straight leg raise of the left leg was negative [Straight-Leg Raise Test - Right] : straight leg raise  of the right leg was negative

## 2024-02-07 NOTE — ASSESSMENT
[FreeTextEntry1] : 43 year old female 6 years s/p C5-C7 ACDF presenting with cervical radiculopathy and myelopathy. MRI of cervical  ordered to assess for disc herniation or stenosis. Cervical flexion-extension x-rays ordered to assess for instability of spine. She will start a course of physical therapy. Patient was provided with a script today. Gabapentin 300 mg BID ordered for pain and paresthesia, methocarbamol 500 mg was ordered for muscle spasm. Medication Purpose, Action, Possible interactions, Side effects as well as adverse effects explain to pt. She will return to see Dr. Mansfield after imaging.  Smoking cessation was discussed with the patient.
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
## 2024-02-08 ENCOUNTER — APPOINTMENT (OUTPATIENT)
Dept: SPINE | Facility: CLINIC | Age: 44
End: 2024-02-08
Payer: COMMERCIAL

## 2024-02-08 VITALS — DIASTOLIC BLOOD PRESSURE: 77 MMHG | SYSTOLIC BLOOD PRESSURE: 116 MMHG | OXYGEN SATURATION: 92 % | HEART RATE: 78 BPM

## 2024-02-08 DIAGNOSIS — Z98.1 ARTHRODESIS STATUS: ICD-10-CM

## 2024-02-08 DIAGNOSIS — M54.12 RADICULOPATHY, CERVICAL REGION: ICD-10-CM

## 2024-02-08 PROCEDURE — 99213 OFFICE O/P EST LOW 20 MIN: CPT
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
## 2024-02-08 NOTE — ASSESSMENT
[FreeTextEntry1] : 43 year old female 6 years s/p C5-C7 ACDF presenting with cervical radiculopathy on the right side. X-rays and MRI are unconvincing as to the source of her present complaints.  I do not see any neural impingement or motion that would explain her symptoms.  I am not recommending any further surgery at this time. I recommend she starts a course of physical therapy and see pain management.

## 2024-02-08 NOTE — PHYSICAL EXAM
[Abnormal Walk] : normal gait [FreeTextEntry6] : Mild weakness of deltoid, biceps, triceps and  4/5 with questionable full exertion

## 2024-02-08 NOTE — DATA REVIEWED
[de-identified] : Cervical spine from [facility] on 2/7/2024 [de-identified] : Cervical spine from [facility] on 2/7/2024

## 2024-12-12 NOTE — ED PROVIDER NOTE - NS ED MD DISPO DISCHARGE CCDA
Pt is deconditioned due to prolonged hospital stay; also with limited mobility 2ndary to Delirium with Dementia; @ risk for falls Patient/Caregiver provided printed discharge information.

## 2025-03-19 NOTE — DISCHARGE NOTE ADULT - MEDICATION SUMMARY - MEDICATIONS TO CHANGE
Pt viewed on MyChart.  
I will SWITCH the dose or number of times a day I take the medications listed below when I get home from the hospital:  None

## 2025-05-21 NOTE — ED ADULT NURSE NOTE - PAIN RATING/NUMBER SCALE (0-10): REST
[FreeTextEntry1] : PROCEDURE: MRI LUMBAR SPINE  ORDER #: [id number] CC: ; ; ; End of cc's  CLINICAL INFORMATION: Lower back pain. Pain across back with thigh pain and groin pain down right leg  ADDITIONAL CLINICAL INFORMATION: Not Applicable  TECHNIQUE: Multiplanar, multisequence MRI was performed of the lumbar spine. IV Contrast: NONE  PRIOR STUDIES: No priors available for comparison.  FINDINGS:  LOCALIZER: No additional findings. BONES: The patient is status post posterior fusion of L4 and L5 with interbody fusion at L5. The patient is also status post bilateral L5 laminectomies. There is mild to moderate chronic Schmorl's node at the superior endplate of L3 vertebral body. There is no acute fracture or bone marrow edema. There is diffuse throughout the bone marrow that may represent osteopenia. ALIGNMENT: There is trace retrolisthesis of L3 on L4. There is trace anterolisthesis of L4 on L5.. Disc spaces: There is loss of T2 signal within the L2/L3 and L3/L4 and L5/S1 disc is consistent with mild desiccation. SACROILIAC JOINTS/SACRUM: There is no sacral fracture. The SI joints are partially visualized but are intact. There is vacuum phenomenon and mild anterior osteophyte formation consistent with mild degenerative changes of the bilateral SI joints. CONUS AND CAUDA EQUINA: The distal cord and conus are normal in signal. Conus terminates at L1. VISUALIZED INTRAPELVIC/INTRA-ABDOMINAL SOFT TISSUES: There are a few small cystic lesion seen at the inferior pole of the right kidney. PARASPINAL SOFT TISSUES: Normal.   INDIVIDUAL LEVELS:  LOWER THORACIC SPINE: No spinal canal or neuroforaminal stenosis.  L1-L2: No spinal canal or neuroforaminal stenosis. L2-L3: There is a mild diffuse disc bulge with a superimposed right subarticular foraminal and far lateral broad-based disc protrusion flattening the ventral thecal sac and contacting the right descending L3 nerve root. There is mild right narrowing of the neural foramen. There is narrowing of the right lateral recess. The left neuroforamen is patent. The bilateral exiting nerve roots appear within normal limits. The constellation of findings is causing mild spinal canal stenosis. L3-L4: There is trace retrolisthesis with a superimposed diffuse disc bulge flattening the ventral thecal sac and in close proximity to the right L3 foraminal exiting nerve root. There is moderate to severe left foraminal narrowing. There is severe right foraminal narrowing. Constellation of findings is causing moderate to severe spinal canal stenosis. L4-L5: There is minimal unroofing of the posterior disc space due to the trace anterolisthesis. There is a minimal diffuse disc bulge contacting the ventral thecal sac. The bilateral neuroforamen are patent. The bilateral L4 foraminal exiting nerve roots are within normal limits. There has been decompression at this level. L5-S1: There is a minimal diffuse disc bulge flattening the ventral thecal sac. There is mild-to-moderate bilateral foraminal narrowing. There is moderate facet and ligamentous hypertrophy. The constellation of findings is causing mild spinal canal stenosis.  No spinal canal or neuroforaminal stenosis.   IMPRESSION:  Status post posterior fusion of L4 and L5 with interbody fusion at L5.  Status post bilateral L5 laminectomies. Mild to moderate chronic Schmorl's node at the superior endplate of L3 vertebral body. No acute fracture or bone marrow edema. Degenerative changes of the lumbar spine most notable at L3/L4. L2-L3 mild diffuse disc bulge with a superimposed right subarticular foraminal and far lateral broad-based disc protrusion contacting the right descending L3 nerve root. L2/L3 mild spinal canal stenosis. L3-L4 diffuse disc bulge flattening the ventral thecal sac and in close proximity to the right L3 foraminal exiting nerve root. L3/L4 moderate to severe spinal canal stenosis. L4-L5 minimal diffuse disc. There has been decompression at this level. L5-S1 minimal diffuse disc bulge L5/S1 mild spinal canal stenosis.  --- End of Report ---   Electronically Signed: ____________________________________________ 9